# Patient Record
Sex: FEMALE | Race: WHITE | NOT HISPANIC OR LATINO | Employment: OTHER | URBAN - NONMETROPOLITAN AREA
[De-identification: names, ages, dates, MRNs, and addresses within clinical notes are randomized per-mention and may not be internally consistent; named-entity substitution may affect disease eponyms.]

---

## 2018-06-25 ENCOUNTER — OFFICE VISIT (OUTPATIENT)
Dept: CARDIOLOGY | Facility: CLINIC | Age: 63
End: 2018-06-25

## 2018-06-25 VITALS
HEIGHT: 63 IN | HEART RATE: 83 BPM | WEIGHT: 188 LBS | SYSTOLIC BLOOD PRESSURE: 160 MMHG | OXYGEN SATURATION: 94 % | DIASTOLIC BLOOD PRESSURE: 75 MMHG | BODY MASS INDEX: 33.31 KG/M2

## 2018-06-25 DIAGNOSIS — I63.9 CEREBROVASCULAR ACCIDENT (CVA), UNSPECIFIED MECHANISM (HCC): ICD-10-CM

## 2018-06-25 DIAGNOSIS — I25.10 CORONARY ARTERY DISEASE INVOLVING NATIVE CORONARY ARTERY OF NATIVE HEART WITHOUT ANGINA PECTORIS: Primary | ICD-10-CM

## 2018-06-25 DIAGNOSIS — R42 DIZZINESS: ICD-10-CM

## 2018-06-25 DIAGNOSIS — E78.5 HYPERLIPIDEMIA, UNSPECIFIED HYPERLIPIDEMIA TYPE: ICD-10-CM

## 2018-06-25 DIAGNOSIS — R07.2 PRECORDIAL PAIN: ICD-10-CM

## 2018-06-25 DIAGNOSIS — I10 ESSENTIAL HYPERTENSION: ICD-10-CM

## 2018-06-25 DIAGNOSIS — R06.02 SHORTNESS OF BREATH: ICD-10-CM

## 2018-06-25 PROBLEM — F31.9 BIPOLAR 1 DISORDER (HCC): Status: ACTIVE | Noted: 2018-06-25

## 2018-06-25 PROBLEM — E11.9 TYPE 2 DIABETES MELLITUS WITHOUT COMPLICATION (HCC): Status: ACTIVE | Noted: 2018-06-25

## 2018-06-25 PROCEDURE — 99214 OFFICE O/P EST MOD 30 MIN: CPT | Performed by: NURSE PRACTITIONER

## 2018-06-25 PROCEDURE — 93000 ELECTROCARDIOGRAM COMPLETE: CPT | Performed by: NURSE PRACTITIONER

## 2018-06-25 RX ORDER — ASENAPINE 10 MG/1
TABLET SUBLINGUAL
COMMUNITY

## 2018-06-25 RX ORDER — TIZANIDINE 4 MG/1
TABLET ORAL
COMMUNITY
End: 2018-06-25

## 2018-06-25 RX ORDER — LAMOTRIGINE 100 MG/1
100 TABLET ORAL 2 TIMES DAILY
COMMUNITY

## 2018-06-25 RX ORDER — PROMETHAZINE HYDROCHLORIDE 25 MG/1
25 TABLET ORAL AS NEEDED
COMMUNITY

## 2018-06-25 RX ORDER — BUSPIRONE HYDROCHLORIDE 10 MG/1
10 TABLET ORAL 3 TIMES DAILY
COMMUNITY

## 2018-06-25 RX ORDER — OXYCODONE AND ACETAMINOPHEN 10; 325 MG/1; MG/1
1 TABLET ORAL AS NEEDED
COMMUNITY

## 2018-06-25 RX ORDER — OMEPRAZOLE 20 MG/1
20 CAPSULE, DELAYED RELEASE ORAL DAILY
COMMUNITY

## 2018-06-25 RX ORDER — ASPIRIN 81 MG/1
81 TABLET ORAL DAILY
COMMUNITY

## 2018-06-25 RX ORDER — ISOSORBIDE MONONITRATE 30 MG/1
TABLET, EXTENDED RELEASE ORAL
Qty: 30 TABLET | Refills: 11 | Status: SHIPPED | OUTPATIENT
Start: 2018-06-25 | End: 2018-06-25 | Stop reason: SDUPTHER

## 2018-06-25 RX ORDER — ISOSORBIDE MONONITRATE 30 MG/1
TABLET, EXTENDED RELEASE ORAL
Qty: 90 TABLET | Refills: 3 | Status: SHIPPED | OUTPATIENT
Start: 2018-06-25 | End: 2019-02-13

## 2018-06-25 NOTE — PATIENT INSTRUCTIONS
Obesity, Adult  Obesity is the condition of having too much total body fat. Being overweight or obese means that your weight is greater than what is considered healthy for your body size. Obesity is determined by a measurement called BMI. BMI is an estimate of body fat and is calculated from height and weight. For adults, a BMI of 30 or higher is considered obese.  Obesity can eventually lead to other health concerns and major illnesses, including:  · Stroke.  · Coronary artery disease (CAD).  · Type 2 diabetes.  · Some types of cancer, including cancers of the colon, breast, uterus, and gallbladder.  · Osteoarthritis.  · High blood pressure (hypertension).  · High cholesterol.  · Sleep apnea.  · Gallbladder stones.  · Infertility problems.    What are the causes?  The main cause of obesity is taking in (consuming) more calories than your body uses for energy. Other factors that contribute to this condition may include:  · Being born with genes that make you more likely to become obese.  · Having a medical condition that causes obesity. These conditions include:  ? Hypothyroidism.  ? Polycystic ovarian syndrome (PCOS).  ? Binge-eating disorder.  ? Cushing syndrome.  · Taking certain medicines, such as steroids, antidepressants, and seizure medicines.  · Not being physically active (sedentary lifestyle).  · Living where there are limited places to exercise safely or buy healthy foods.  · Not getting enough sleep.    What increases the risk?  The following factors may increase your risk of this condition:  · Having a family history of obesity.  · Being a woman of -American descent.  · Being a man of  descent.    What are the signs or symptoms?  Having excessive body fat is the main symptom of this condition.  How is this diagnosed?  This condition may be diagnosed based on:  · Your symptoms.  · Your medical history.  · A physical exam. Your health care provider may measure:  ? Your BMI. If you are an  adult with a BMI between 25 and less than 30, you are considered overweight. If you are an adult with a BMI of 30 or higher, you are considered obese.  ? The distances around your hips and your waist (circumferences). These may be compared to each other to help diagnose your condition.  ? Your skinfold thickness. Your health care provider may gently pinch a fold of your skin and measure it.    How is this treated?  Treatment for this condition often includes changing your lifestyle. Treatment may include some or all of the following:  · Dietary changes. Work with your health care provider and a dietitian to set a weight-loss goal that is healthy and reasonable for you. Dietary changes may include eating:  ? Smaller portions. A portion size is the amount of a particular food that is healthy for you to eat at one time. This varies from person to person.  ? Low-calorie or low-fat options.  ? More whole grains, fruits, and vegetables.  · Regular physical activity. This may include aerobic activity (cardio) and strength training.  · Medicine to help you lose weight. Your health care provider may prescribe medicine if you are unable to lose 1 pound a week after 6 weeks of eating more healthily and doing more physical activity.  · Surgery. Surgical options may include gastric banding and gastric bypass. Surgery may be done if:  ? Other treatments have not helped to improve your condition.  ? You have a BMI of 40 or higher.  ? You have life-threatening health problems related to obesity.    Follow these instructions at home:    Eating and drinking    · Follow recommendations from your health care provider about what you eat and drink. Your health care provider may advise you to:  ? Limit fast foods, sweets, and processed snack foods.  ? Choose low-fat options, such as low-fat milk instead of whole milk.  ? Eat 5 or more servings of fruits or vegetables every day.  ? Eat at home more often. This gives you more control over  what you eat.  ? Choose healthy foods when you eat out.  ? Learn what a healthy portion size is.  ? Keep low-fat snacks on hand.  ? Avoid sugary drinks, such as soda, fruit juice, iced tea sweetened with sugar, and flavored milk.  ? Eat a healthy breakfast.  · Drink enough water to keep your urine clear or pale yellow.  · Do not go without eating for long periods of time (do not fast) or follow a fad diet. Fasting and fad diets can be unhealthy and even dangerous.  Physical Activity  · Exercise regularly, as told by your health care provider. Ask your health care provider what types of exercise are safe for you and how often you should exercise.  · Warm up and stretch before being active.  · Cool down and stretch after being active.  · Rest between periods of activity.  Lifestyle  · Limit the time that you spend in front of your TV, computer, or video game system.  · Find ways to reward yourself that do not involve food.  · Limit alcohol intake to no more than 1 drink a day for nonpregnant women and 2 drinks a day for men. One drink equals 12 oz of beer, 5 oz of wine, or 1½ oz of hard liquor.  General instructions  · Keep a weight loss journal to keep track of the food you eat and how much you exercise you get.  · Take over-the-counter and prescription medicines only as told by your health care provider.  · Take vitamins and supplements only as told by your health care provider.  · Consider joining a support group. Your health care provider may be able to recommend a support group.  · Keep all follow-up visits as told by your health care provider. This is important.  Contact a health care provider if:  · You are unable to meet your weight loss goal after 6 weeks of dietary and lifestyle changes.  This information is not intended to replace advice given to you by your health care provider. Make sure you discuss any questions you have with your health care provider.  Document Released: 01/25/2006 Document Revised:  05/22/2017 Document Reviewed: 10/05/2016  [employer] Interactive Patient Education © 2018 [employer]  MyPlate from [employer]  The general, healthful diet is based on the 2010 Dietary Guidelines for Americans. The amount of food you need to eat from each food group depends on your age, sex, and level of physical activity and can be individualized by a dietitian. Go to ChooseMyPlate.gov for more information.  What do I need to know about the MyPlate plan?  · Enjoy your food, but eat less.  · Avoid oversized portions.  ? ½ of your plate should include fruits and vegetables.  ? ¼ of your plate should be grains.  ? ¼ of your plate should be protein.  Grains  · Make at least half of your grains whole grains.  · For a 2,000 calorie daily food plan, eat 6 oz every day.  · 1 oz is about 1 slice bread, 1 cup cereal, or ½ cup cooked rice, cereal, or pasta.  Vegetables  · Make half your plate fruits and vegetables.  · For a 2,000 calorie daily food plan, eat 2½ cups every day.  · 1 cup is about 1 cup raw or cooked vegetables or vegetable juice or 2 cups raw leafy greens.  Fruits  · Make half your plate fruits and vegetables.  · For a 2,000 calorie daily food plan, eat 2 cups every day.  · 1 cup is about 1 cup fruit or 100% fruit juice or ½ cup dried fruit.  Protein  · For a 2,000 calorie daily food plan, eat 5½ oz every day.  · 1 oz is about 1 oz meat, poultry, or fish, ¼ cup cooked beans, 1 egg, 1 Tbsp peanut butter, or ½ oz nuts or seeds.  Dairy  · Switch to fat-free or low-fat (1%) milk.  · For a 2,000 calorie daily food plan, eat 3 cups every day.  · 1 cup is about 1 cup milk or yogurt or soy milk (soy beverage), 1½ oz natural cheese, or 2 oz processed cheese.  Fats, Oils, and Empty Calories  · Only small amounts of oils are recommended.  · Empty calories are calories from solid fats or added sugars.  · Compare sodium in foods like soup, bread, and frozen meals. Choose the foods with lower numbers.  · Drink water instead of  sugary drinks.  What foods can I eat?  Grains  Whole grains such as whole wheat, quinoa, millet, and bulgur. Bread, rolls, and pasta made from whole grains. Brown or wild rice. Hot or cold cereals made from whole grains and without added sugar.  Vegetables  All fresh vegetables, especially fresh red, dark green, or orange vegetables. Peas and beans. Low-sodium frozen or canned vegetables prepared without added salt. Low-sodium vegetable juices.  Fruits  All fresh, frozen, and dried fruits. Canned fruit packed in water or fruit juice without added sugar. Fruit juices without added sugar.  Meats and Other Protein Sources  Boiled, baked, or grilled lean meat trimmed of fat. Skinless poultry. Fresh seafood and shellfish. Canned seafood packed in water. Unsalted nuts and unsalted nut butters. Tofu. Dried beans and pea. Eggs.  Dairy  Low-fat or fat-free milk, yogurt, and cheeses.  Sweets and Desserts  Frozen desserts made from low-fat milk.  Fats and Oils  Olive, peanut, and canola oils and margarine. Salad dressing and mayonnaise made from these oils.  Other  Soups and casseroles made from allowed ingredients and without added fat or salt.  The items listed above may not be a complete list of recommended foods or beverages. Contact your dietitian for more options.  What foods are not recommended?  Grains  Sweetened, low-fiber cereals. Packaged baked goods. Snack crackers and chips. Cheese crackers, butter crackers, and biscuits. Frozen waffles, sweet breads, doughnuts, pastries, packaged baking mixes, pancakes, cakes, and cookies.  Vegetables  Regular canned or frozen vegetables or vegetables prepared with salt. Canned tomatoes. Canned tomato sauce. Fried vegetables. Vegetables in cream sauce or cheese sauce.  Fruits  Fruits packed in syrup or made with added sugar.  Meats and Other Protein Sources  Marbled or fatty meats such as ribs. Poultry with skin. Fried meats, poultry, eggs, or fish. Sausages, hot dogs, and deli  meats such as pastrami, bologna, or salami.  Dairy  Whole milk, cream, cheeses made from whole milk, sour cream. Ice cream or yogurt made from whole milk or with added sugar.  Beverages  For adults, no more than one alcoholic drink per day. Regular soft drinks or other sugary beverages. Juice drinks.  Sweets and Desserts  Sugary or fatty desserts, candy, and other sweets.  Fats and Oils  Solid shortening or partially hydrogenated oils. Solid margarine. Margarine that contains trans fats. Butter.  The items listed above may not be a complete list of foods and beverages to avoid. Contact your dietitian for more information.  This information is not intended to replace advice given to you by your health care provider. Make sure you discuss any questions you have with your health care provider.  Document Released: 01/06/2009 Document Revised: 05/25/2017 Document Reviewed: 11/26/2014  [employer] Interactive Patient Education © 2018 [employer]    Nonspecific Chest Pain  Chest pain can be caused by many different conditions. There is a chance that your pain could be related to something serious, such as a heart attack or a blood clot in your lungs. Chest pain can also be caused by conditions that are not life-threatening. If you have chest pain, it is very important to follow up with your doctor.  Follow these instructions at home:  Medicines  · If you were prescribed an antibiotic medicine, take it as told by your doctor. Do not stop taking the antibiotic even if you start to feel better.  · Take over-the-counter and prescription medicines only as told by your doctor.  Lifestyle  · Do not use any products that contain nicotine or tobacco, such as cigarettes and e-cigarettes. If you need help quitting, ask your doctor.  · Do not drink alcohol.  · Make lifestyle changes as told by your doctor. These may include:  ? Getting regular exercise. Ask your doctor for some activities that are safe for you.  ? Eating a  heart-healthy diet. A diet specialist (dietitian) can help you to learn healthy eating options.  ? Staying at a healthy weight.  ? Managing diabetes, if needed.  ? Lowering your stress, as with deep breathing or spending time in nature.  General instructions  · Avoid any activities that make you feel chest pain.  · If your chest pain is because of heartburn:  ? Raise (elevate) the head of your bed about 6 inches (15 cm). You can do this by putting blocks under the bed legs at the head of the bed.  ? Do not sleep with extra pillows under your head. That does not help heartburn.  · Keep all follow-up visits as told by your doctor. This is important. This includes any further testing if your chest pain does not go away.  Contact a doctor if:  · Your chest pain does not go away.  · You have a rash with blisters on your chest.  · You have a fever.  · You have chills.  Get help right away if:  · Your chest pain is worse.  · You have a cough that gets worse, or you cough up blood.  · You have very bad (severe) pain in your belly (abdomen).  · You are very weak.  · You pass out (faint).  · You have either of these for no clear reason:  ? Sudden chest discomfort.  ? Sudden discomfort in your arms, back, neck, or jaw.  · You have shortness of breath at any time.  · You suddenly start to sweat, or your skin gets clammy.  · You feel sick to your stomach (nauseous).  · You throw up (vomit).  · You suddenly feel light-headed or dizzy.  · Your heart starts to beat fast, or it feels like it is skipping beats.  These symptoms may be an emergency. Do not wait to see if the symptoms will go away. Get medical help right away. Call your local emergency services (911 in the [place] Do not drive yourself to the hospital.  This information is not intended to replace advice given to you by your health care provider. Make sure you discuss any questions you have with your health care provider.  Document Released: 06/05/2009 Document Revised:  09/11/2017 Document Reviewed: 09/11/2017  [employer] Interactive Patient Education © 2017 Elsevier Inc.

## 2018-06-25 NOTE — PROGRESS NOTES
Subjective   Keara Ortiz is a 62 y.o. female     Chief Complaint   Patient presents with   • Hypertension     presents as a new patient    • Chest Pain   • Shortness of Breath   • Palpitations       HPI    Problem List:    1. Minor nonobstructive CAD per cath in 2006.  1.1 Left heart catheter 7/27/06-20% proximal narrowing to the circumflex, EF 65-70%, left ventricular end-diastolic pressure 25  1.2 Stress test 10/13/14-low risk, no evidence of ischemia  2. Hypertension  3. Dyslipidemia  4. Diabetes mellitus  5. CVA   5.1 CT Head 5/31/18 - mod remote microvascular ischemic changes in the periventricular white matter with no acute abnormality or change   6. Shortness of breath 10/13/14-EF 60-65%, mild LVH, mild diastolic dysfunction, revealed to mild TR, mild posterior pericardial effusion which may extend minimally to the lateral wall, PA 30-35  7. History of pancreatitis    Patient is a 62-year-old female who presents today for follow-up.  She states that she has been having midsternal chest tightness that radiates into the neck and down the right arm to her elbow.  She's been having this roughly for one year now.  She will take 1 nitroglycerin when it occurs and it will resolve.  It can occur at any time she has every couple of days.  She will get short of breath, nauseated, lightheaded, diaphoretic and she's been very fatigued since this started.She says she has palpitations with her chest pain as well.  She says she will get dizzy at times with position change.  She denies any presyncope, syncope, orthopnea or PND.  She does get swelling in her knees with a history of knee surgeries.  She says the swelling in her feet has improved significantly.  She says for approximately 8 months she has been short of breath with pretty much any activity that she does.  She says about a couple weeks ago she woke up could not use her left side.  She says she drug herself to her daughter's room and they got her back in  bed.  She says the next day she then had some language issues.  She says eventually somewhat resolved.  She was recently in the hospital for urosepsis.She says Dr. Hensley is going to be her new PCP.  She was living in  for the last 3 years but just moved back because her kids and grandchildren are here.      Current Outpatient Prescriptions   Medication Sig Dispense Refill   • asenapine maleate (SAPHRIS) 10 MG sublingual tablet sublingual tablet Saphris (black cherry) 10 mg sublingual tablet     • aspirin 81 MG EC tablet Take 81 mg by mouth Daily.     • busPIRone (BUSPAR) 10 MG tablet Take 10 mg by mouth 3 (Three) Times a Day.     • CloNIDine (CATAPRES) 0.1 MG tablet Take 1 tablet by mouth Every 6 (Six) Hours As Needed.     • CloNIDine (CATAPRES-TTS) 0.3 MG/24HR patch clonidine 0.3 mg/24 hr weekly transdermal patch     • dicyclomine (BENTYL) 10 MG capsule Take 10 mg by mouth 4 (Four) Times a Day Before Meals & at Bedtime.     • furosemide (LASIX) 20 MG tablet Take 20 mg by mouth Daily As Needed.     • gabapentin (NEURONTIN) 800 MG tablet Take 800 mg by mouth 3 (Three) Times a Day.     • Labetalol HCl (TRANDATE PO) Take 400 mg by mouth 2 (Two) Times a Day.     • lamoTRIgine (LaMICtal) 100 MG tablet Take 100 mg by mouth 2 (Two) Times a Day.     • lisinopril (PRINIVIL,ZESTRIL) 40 MG tablet Take 40 mg by mouth Daily.     • nitroglycerin (NITROSTAT) 0.4 MG SL tablet Place  under the tongue.     • OLANZapine (zyPREXA) 10 MG tablet Take 10 mg by mouth Daily.     • omeprazole (priLOSEC) 20 MG capsule Take 20 mg by mouth Daily.     • ondansetron (ZOFRAN) 4 MG tablet Take 4 mg by mouth Every 8 (Eight) Hours As Needed for Nausea or Vomiting.     • oxyCODONE-acetaminophen (PERCOCET)  MG per tablet Take 1 tablet by mouth As Needed for Moderate Pain .     • pancrelipase, Lip-Prot-Amyl, (CREON) 84449-06736 units capsule delayed-release particles capsule Take 24,000 units of lipase by mouth 3 (Three) Times a Day With  Meals.     • promethazine (PHENERGAN) 25 MG tablet Take 25 mg by mouth As Needed for Nausea or Vomiting.     • TiZANidine (ZANAFLEX) 4 MG capsule Take 4 mg by mouth 3 (Three) Times a Day.     • isosorbide mononitrate (IMDUR) 30 MG 24 hr tablet Take 1/2 tablet daily 90 tablet 3     No current facility-administered medications for this visit.        ALLERGIES    Fentanyl; Midazolam; Contrast dye; Hydrocodone; Metoclopramide; Sulfa antibiotics; Zolpidem; and Penicillins    Past Medical History:   Diagnosis Date   • Disease of thyroid gland    • Hyperlipidemia    • Hypertension    • Kidney stone    • Pneumonia        Social History     Social History   • Marital status:      Spouse name: N/A   • Number of children: N/A   • Years of education: N/A     Occupational History   • Not on file.     Social History Main Topics   • Smoking status: Never Smoker   • Smokeless tobacco: Not on file   • Alcohol use No   • Drug use: Unknown   • Sexual activity: Defer     Other Topics Concern   • Not on file     Social History Narrative   • No narrative on file       Family History   Problem Relation Age of Onset   • Heart disease Mother    • Cancer Mother    • Hypertension Mother    • Hyperlipidemia Mother    • Heart disease Father    • Cancer Father    • Hyperlipidemia Father    • Hypertension Father        Review of Systems   Constitutional: Positive for diaphoresis (with CP ) and fatigue (for the past year ).   HENT: Positive for hearing loss and trouble swallowing (throat gets tight). Negative for rhinorrhea and sneezing.    Eyes: Positive for visual disturbance ( wears glasses ).   Respiratory: Positive for shortness of breath (with CP; for about 8 mos with any activity ). Negative for chest tightness.    Cardiovascular: Positive for chest pain ( midsternum chest tightness rad into the neck and down the right arm to her elbow; for 1 year; 1 nitro per episode; every couple of days; any time ), palpitations (with CP ) and leg  "swelling (knees, history of knee sx; feet are better).   Gastrointestinal: Positive for diarrhea and nausea (with CP ). Negative for constipation and vomiting.   Endocrine: Negative.    Genitourinary: Positive for difficulty urinating.   Musculoskeletal: Positive for arthralgias, back pain, myalgias and neck pain ( patient has noticed a squeezing and pain ).   Skin: Negative.    Allergic/Immunologic: Negative for environmental allergies and food allergies.   Neurological: Positive for dizziness, weakness (left side a coule of weeks ago ) and light-headedness (with CP ). Negative for syncope.   Hematological: Bruises/bleeds easily ( bruises easily ).   Psychiatric/Behavioral: Positive for confusion ( patient states she has been very confused lately and has recently been in the hospital ). The patient is not nervous/anxious.        Objective   /75 (BP Location: Left arm, Patient Position: Sitting)   Pulse 83   Ht 160 cm (63\")   Wt 85.3 kg (188 lb)   SpO2 94%   BMI 33.30 kg/m²   Vitals:    06/25/18 1534   BP: 160/75   BP Location: Left arm   Patient Position: Sitting   Pulse: 83   SpO2: 94%   Weight: 85.3 kg (188 lb)   Height: 160 cm (63\")      Lab Results (most recent)     None        Physical Exam   Constitutional: She is oriented to person, place, and time. Vital signs are normal. She appears well-developed and well-nourished. She is active and cooperative.   HENT:   Head: Normocephalic.   Mouth/Throat: She has dentures (upper).   Eyes: Lids are normal.   Wears glasses    Neck: Normal carotid pulses, no hepatojugular reflux and no JVD present. Carotid bruit is not present.   Cardiovascular: Normal rate and regular rhythm.    Murmur heard.   Systolic (RUSB, LUSB and LLSB 1-2/6) murmur is present   Pulses:       Radial pulses are 2+ on the right side, and 2+ on the left side.        Dorsalis pedis pulses are 2+ on the right side, and 2+ on the left side.        Posterior tibial pulses are 2+ on the right " side, and 2+ on the left side.   1+ edema BLE.    Pulmonary/Chest: Effort normal and breath sounds normal.   Abdominal: Normal appearance and bowel sounds are normal.   Neurological: She is alert and oriented to person, place, and time.   Skin: Skin is warm, dry and intact.   Psychiatric: She has a normal mood and affect. Her speech is normal and behavior is normal. Judgment and thought content normal. Cognition and memory are normal.       Procedure     ECG 12 Lead  Date/Time: 6/25/2018 4:20 PM  Performed by: CHOCO AGARWAL  Authorized by: CHOCO AGARWAL   Comparison: compared with previous ECG from 11/11/2014  Similar to previous ECG  Rhythm: sinus rhythm  Rate: normal  BPM: 78  QRS axis: normal  Clinical impression: normal ECG                 Assessment/Plan      Diagnosis Plan   1. Coronary artery disease involving native coronary artery of native heart without angina pectoris  Stress Test With Myocardial Perfusion One Day    Adult Transthoracic Echo Complete W/ Cont if Necessary Per Protocol   2. Essential hypertension  Stress Test With Myocardial Perfusion One Day    Adult Transthoracic Echo Complete W/ Cont if Necessary Per Protocol    Blood Pressure Device   3. Hyperlipidemia, unspecified hyperlipidemia type  Stress Test With Myocardial Perfusion One Day    Adult Transthoracic Echo Complete W/ Cont if Necessary Per Protocol   4. Precordial pain  Stress Test With Myocardial Perfusion One Day    Adult Transthoracic Echo Complete W/ Cont if Necessary Per Protocol    isosorbide mononitrate (IMDUR) 30 MG 24 hr tablet   5. Shortness of breath  Stress Test With Myocardial Perfusion One Day    Adult Transthoracic Echo Complete W/ Cont if Necessary Per Protocol   6. Dizziness  US Carotid Bilateral   7. Cerebrovascular accident (CVA), unspecified mechanism  US Carotid Bilateral       Return in about 8 weeks (around 8/20/2018).    CAD/Hypertension/hyperlipidemia/chest pain/shortness of breath-patient will have  ischemia workup, stress and echo.  Dizziness/CVA-patient will have carotid ultrasound.  She will start isosorbide half a tablet day.  She will use nitroglycerin when necessary for chest pain no resolution she'll go to the ER.  She will follow-up in 8 weeks or sooner if any changes.         Patient's Body mass index is 33.3 kg/m². BMI is above normal parameters. Recommendations include: educational material.      Electronically signed by:

## 2018-07-24 ENCOUNTER — OUTSIDE FACILITY SERVICE (OUTPATIENT)
Dept: CARDIOLOGY | Facility: CLINIC | Age: 63
End: 2018-07-24

## 2018-07-24 ENCOUNTER — HOSPITAL ENCOUNTER (OUTPATIENT)
Dept: CARDIOLOGY | Facility: HOSPITAL | Age: 63
Discharge: HOME | End: 2018-07-24

## 2018-07-24 LAB
MAXIMAL PREDICTED HEART RATE: 156 BPM
MAXIMAL PREDICTED HEART RATE: 156 BPM
STRESS TARGET HR: 133 BPM
STRESS TARGET HR: 133 BPM

## 2018-07-24 PROCEDURE — 93018 CV STRESS TEST I&R ONLY: CPT | Performed by: INTERNAL MEDICINE

## 2018-07-24 PROCEDURE — 93880 EXTRACRANIAL BILAT STUDY: CPT | Performed by: INTERNAL MEDICINE

## 2018-07-24 PROCEDURE — 25010000002 REGADENOSON 0.4 MG/5ML SOLUTION: Performed by: INTERNAL MEDICINE

## 2018-07-24 PROCEDURE — 78452 HT MUSCLE IMAGE SPECT MULT: CPT | Performed by: INTERNAL MEDICINE

## 2018-07-24 PROCEDURE — 93880 EXTRACRANIAL BILAT STUDY: CPT

## 2018-07-24 PROCEDURE — 93017 CV STRESS TEST TRACING ONLY: CPT

## 2018-07-24 PROCEDURE — 78452 HT MUSCLE IMAGE SPECT MULT: CPT

## 2018-07-24 PROCEDURE — A9500 TC99M SESTAMIBI: HCPCS | Performed by: INTERNAL MEDICINE

## 2018-07-24 PROCEDURE — 93306 TTE W/DOPPLER COMPLETE: CPT

## 2018-07-24 PROCEDURE — 0 TECHNETIUM SESTAMIBI: Performed by: INTERNAL MEDICINE

## 2018-07-24 PROCEDURE — 93306 TTE W/DOPPLER COMPLETE: CPT | Performed by: INTERNAL MEDICINE

## 2018-07-24 RX ADMIN — TECHNETIUM TC 99M SESTAMIBI 1 DOSE: 1 INJECTION INTRAVENOUS at 08:45

## 2018-07-24 RX ADMIN — REGADENOSON 0.4 MG: 0.08 INJECTION, SOLUTION INTRAVENOUS at 08:45

## 2018-07-25 ENCOUNTER — TELEPHONE (OUTPATIENT)
Dept: CARDIOLOGY | Facility: CLINIC | Age: 63
End: 2018-07-25
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
## 2018-07-26 ENCOUNTER — TELEPHONE (OUTPATIENT)
Dept: CARDIOLOGY | Facility: CLINIC | Age: 63
End: 2018-07-26

## 2018-07-26 NOTE — TELEPHONE ENCOUNTER
Patient made aware of carotid u/s results. patient verbalized understanding and is to call our office with questions or concerns. Daniel/GIO     ----- Message from ERINN Suh sent at 7/26/2018  3:21 PM EDT -----  Will you please advise patient

## 2018-07-26 NOTE — TELEPHONE ENCOUNTER
Patient stated she is just now getting discharged from [facility] and states they brought her BP  to [facility] and was told to f/u with Dr. Hensley and our office in 1 week due to abnormal stress testing. I offered to scheduled patient a f/u apt. At the time and she asked we call her back in about 30 minutes when she got home to schedule. MURTAZA Joseph called to schedule patient an apt. And is to call our office with questions or concerns.      ----- Message from Aura Ghotra sent at 7/26/2018 10:19 AM EDT -----  Contact: PATIENT  THE PATIENT CALLED AND STATES SHE WAS SUPPOSED TO CALL TODAY AND  SPEAK WITH STEVE.  SHE CAN BE REACHED -[phone].  THANKS

## 2018-08-21 ENCOUNTER — OFFICE VISIT (OUTPATIENT)
Dept: CARDIOLOGY | Facility: CLINIC | Age: 63
End: 2018-08-21

## 2018-08-21 VITALS
BODY MASS INDEX: 32.46 KG/M2 | DIASTOLIC BLOOD PRESSURE: 75 MMHG | SYSTOLIC BLOOD PRESSURE: 132 MMHG | HEIGHT: 63 IN | WEIGHT: 183.2 LBS | OXYGEN SATURATION: 93 % | HEART RATE: 82 BPM

## 2018-08-21 DIAGNOSIS — I25.10 CORONARY ARTERY DISEASE INVOLVING NATIVE CORONARY ARTERY OF NATIVE HEART WITHOUT ANGINA PECTORIS: ICD-10-CM

## 2018-08-21 DIAGNOSIS — R06.02 SHORTNESS OF BREATH: Primary | ICD-10-CM

## 2018-08-21 DIAGNOSIS — I10 ESSENTIAL HYPERTENSION: ICD-10-CM

## 2018-08-21 PROCEDURE — 99214 OFFICE O/P EST MOD 30 MIN: CPT | Performed by: PHYSICIAN ASSISTANT

## 2018-08-21 NOTE — PATIENT INSTRUCTIONS
Heart-Healthy Eating Plan  Many factors influence your heart health, including eating and exercise habits. Heart (coronary) risk increases with abnormal blood fat (lipid) levels. Heart-healthy meal planning includes limiting unhealthy fats, increasing healthy fats, and making other small dietary changes. This includes maintaining a healthy body weight to help keep lipid levels within a normal range.  What is my plan?  Your health care provider recommends that you:  · Get no more than _________% of the total calories in your daily diet from fat.  · Limit your intake of saturated fat to less than _________% of your total calories each day.  · Limit the amount of cholesterol in your diet to less than _________ mg per day.    What types of fat should I choose?  · Choose healthy fats more often. Choose monounsaturated and polyunsaturated fats, such as olive oil and canola oil, flaxseeds, walnuts, almonds, and seeds.  · Eat more omega-3 fats. Good choices include salmon, mackerel, sardines, tuna, flaxseed oil, and ground flaxseeds. Aim to eat fish at least two times each week.  · Limit saturated fats. Saturated fats are primarily found in animal products, such as meats, butter, and cream. Plant sources of saturated fats include palm oil, palm kernel oil, and coconut oil.  · Avoid foods with partially hydrogenated oils in them. These contain trans fats. Examples of foods that contain trans fats are stick margarine, some tub margarines, cookies, crackers, and other baked goods.  What general guidelines do I need to follow?  · Check food labels carefully to identify foods with trans fats or high amounts of saturated fat.  · Fill one half of your plate with vegetables and green salads. Eat 4-5 servings of vegetables per day. A serving of vegetables equals 1 cup of raw leafy vegetables, ½ cup of raw or cooked cut-up vegetables, or ½ cup of vegetable juice.  · Fill one fourth of your plate with whole grains. Look for the word  "\"whole\" as the first word in the ingredient list.  · Fill one fourth of your plate with lean protein foods.  · Eat 4-5 servings of fruit per day. A serving of fruit equals one medium whole fruit, ¼ cup of dried fruit, ½ cup of fresh, frozen, or canned fruit, or ½ cup of 100% fruit juice.  · Eat more foods that contain soluble fiber. Examples of foods that contain this type of fiber are apples, broccoli, carrots, beans, peas, and barley. Aim to get 20-30 g of fiber per day.  · Eat more home-cooked food and less restaurant, buffet, and fast food.  · Limit or avoid alcohol.  · Limit foods that are high in starch and sugar.  · Avoid fried foods.  · Cook foods by using methods other than frying. Baking, boiling, grilling, and broiling are all great options. Other fat-reducing suggestions include:  ? Removing the skin from poultry.  ? Removing all visible fats from meats.  ? Skimming the fat off of stews, soups, and gravies before serving them.  ? Steaming vegetables in water or broth.  · Lose weight if you are overweight. Losing just 5-10% of your initial body weight can help your overall health and prevent diseases such as diabetes and heart disease.  · Increase your consumption of nuts, legumes, and seeds to 4-5 servings per week. One serving of dried beans or legumes equals ½ cup after being cooked, one serving of nuts equals 1½ ounces, and one serving of seeds equals ½ ounce or 1 tablespoon.  · You may need to monitor your salt (sodium) intake, especially if you have high blood pressure. Talk with your health care provider or dietitian to get more information about reducing sodium.  What foods can I eat?  Grains    Breads, including [place] citizen, white, jonathan, wheat, raisin, rye, oatmeal, and Italian. Tortillas that are neither fried nor made with lard or trans fat. Low-fat rolls, including hotdog and hamburger buns and English muffins. Biscuits. Muffins. Waffles. Pancakes. Light popcorn. Whole-grain cereals. Flatbread. " Tess toast. Pretzels. Breadsticks. Rusks. Low-fat snacks and crackers, including oyster, saltine, matzo, john, animal, and rye. Rice and pasta, including brown rice and those that are made with whole wheat.  Vegetables  All vegetables.  Fruits  All fruits, but limit coconut.  Meats and Other Protein Sources  Lean, well-trimmed beef, veal, pork, and lamb. Chicken and turkey without skin. All fish and shellfish. Wild duck, rabbit, pheasant, and venison. Egg whites or low-cholesterol egg substitutes. Dried beans, peas, lentils, and tofu. Seeds and most nuts.  Dairy  Low-fat or nonfat cheeses, including ricotta, string, and mozzarella. Skim or 1% milk that is liquid, powdered, or evaporated. Buttermilk that is made with low-fat milk. Nonfat or low-fat yogurt.  Beverages  Mineral water. Diet carbonated beverages.  Sweets and Desserts  Sherbets and fruit ices. Honey, jam, marmalade, jelly, and syrups. Meringues and gelatins. Pure sugar candy, such as hard candy, jelly beans, gumdrops, mints, marshmallows, and small amounts of dark chocolate. Bakari food cake.  Eat all sweets and desserts in moderation.  Fats and Oils  Nonhydrogenated (trans-free) margarines. Vegetable oils, including soybean, sesame, sunflower, olive, peanut, safflower, corn, canola, and cottonseed. Salad dressings or mayonnaise that are made with a vegetable oil. Limit added fats and oils that you use for cooking, baking, salads, and as spreads.  Other  Cocoa powder. Coffee and tea. All seasonings and condiments.  The items listed above may not be a complete list of recommended foods or beverages. Contact your dietitian for more options.  What foods are not recommended?  Grains  Breads that are made with saturated or trans fats, oils, or whole milk. Croissants. Butter rolls. Cheese breads. Sweet rolls. Donuts. Buttered popcorn. Chow mein noodles. High-fat crackers, such as cheese or butter crackers.  Meats and Other Protein Sources  Fatty meats, such  as hotdogs, short ribs, sausage, spareribs, castro, ribeye roast or steak, and mutton. High-fat deli meats, such as salami and bologna. Caviar. Domestic duck and goose. Organ meats, such as kidney, liver, sweetbreads, brains, gizzard, chitterlings, and heart.  Dairy  Cream, sour cream, cream cheese, and creamed cottage cheese. Whole milk cheeses, including blue (rogelio), Chevak Dax, Brie, Az, American, Havarti, Swiss, cheddar, Camembert, and [place]. Whole or 2% milk that is liquid, evaporated, or condensed. Whole buttermilk. Cream sauce or high-fat cheese sauce. Yogurt that is made from whole milk.  Beverages  Regular sodas and drinks with added sugar.  Sweets and Desserts  Frosting. Pudding. Cookies. Cakes other than nicole food cake. Candy that has milk chocolate or white chocolate, hydrogenated fat, butter, coconut, or unknown ingredients. Buttered syrups. Full-fat ice cream or ice cream drinks.  Fats and Oils  Gravy that has suet, meat fat, or shortening. Cocoa butter, hydrogenated oils, palm oil, coconut oil, palm kernel oil. These can often be found in baked products, candy, fried foods, nondairy creamers, and whipped toppings. Solid fats and shortenings, including castro fat, salt pork, lard, and butter. Nondairy cream substitutes, such as coffee creamers and sour cream substitutes. Salad dressings that are made of unknown oils, cheese, or sour cream.  The items listed above may not be a complete list of foods and beverages to avoid. Contact your dietitian for more information.  This information is not intended to replace advice given to you by your health care provider. Make sure you discuss any questions you have with your health care provider.  Document Released: 09/26/2009 Document Revised: 07/07/2017 Document Reviewed: 06/11/2015  [employer] Interactive Patient Education © 2018 [employer]

## 2018-08-21 NOTE — PROGRESS NOTES
Problem list     Subjective   Keara Ortiz is a 62 y.o. female     Chief Complaint   Patient presents with   • Follow-up     here for hospital f/u   • Palpitations   • Coronary Artery Disease   • Shortness of Breath   • Hypertension       HPI    Problem List:     1. Minor nonobstructive CAD per cath in 2006.  1.1 Left heart catheter 7/27/06-20% proximal narrowing to the circumflex, EF 65-70%, left ventricular end-diastolic pressure 25  1.2 Stress test 10/13/14-low risk, no evidence of ischemia  2. Hypertension  3. Dyslipidemia  4. Diabetes mellitus  5. CVA   5.1 CT Head 5/31/18 - mod remote microvascular ischemic changes in the periventricular white matter with no acute abnormality or change   6. Shortness of breath 10/13/14-EF 60-65%, mild LVH, mild diastolic dysfunction, revealed to mild TR, mild posterior pericardial effusion which may extend minimally to the lateral wall, PA 30-35  7. History of pancreatitis    Patient is a 62-year-old female that presents back to the office for follow-up.  Patient was recently admitted in the hospital had small bowel obstruction.  She went underwent exploratory laparotomy by Dr. Gonzalez.  She had adhesions lysed.  She was discharged.    Patient's blood pressure is much improved.  She was seen prior to surgery and admitted once before surgery because of hypertension that is improved.  When she was seen here last before hospital admission, she had stress test and echocardiogram.  Stress test suggest inferior ischemia.  Echocardiogram suggested normal LV function with mild to moderate tricuspid regurgitation and mild mitral regurgitation.    She does not describe any chest discomfort.  She does have mild levels to moderate levels of shortness of breath.  She feels that she is recovering from recent surgery.  She denies PND orthopnea.  She doesn't palpitate or dysrhythmic symptoms and otherwise is doing well      Outpatient Encounter Prescriptions as of 8/21/2018   Medication  Sig Dispense Refill   • asenapine maleate (SAPHRIS) 10 MG sublingual tablet sublingual tablet Saphris (black cherry) 10 mg sublingual tablet     • aspirin 81 MG EC tablet Take 81 mg by mouth Daily.     • busPIRone (BUSPAR) 10 MG tablet Take 10 mg by mouth 3 (Three) Times a Day.     • CloNIDine (CATAPRES) 0.1 MG tablet Take 1 tablet by mouth Every 6 (Six) Hours As Needed.     • CloNIDine (CATAPRES-TTS) 0.3 MG/24HR patch clonidine 0.3 mg/24 hr weekly transdermal patch     • dicyclomine (BENTYL) 10 MG capsule Take 10 mg by mouth 4 (Four) Times a Day Before Meals & at Bedtime.     • furosemide (LASIX) 20 MG tablet Take 20 mg by mouth Daily As Needed.     • gabapentin (NEURONTIN) 800 MG tablet Take 800 mg by mouth 3 (Three) Times a Day.     • isosorbide mononitrate (IMDUR) 30 MG 24 hr tablet Take 1/2 tablet daily 90 tablet 3   • labetalol (TRANDATE) 100 MG tablet Take 400 mg by mouth 2 (Two) Times a Day. Two tablets twice daily.     • lamoTRIgine (LaMICtal) 100 MG tablet Take 100 mg by mouth 2 (Two) Times a Day.     • lisinopril (PRINIVIL,ZESTRIL) 40 MG tablet Take 40 mg by mouth Daily.     • nitroglycerin (NITROSTAT) 0.4 MG SL tablet Place  under the tongue.     • OLANZapine (zyPREXA) 10 MG tablet Take 10 mg by mouth Daily.     • omeprazole (priLOSEC) 20 MG capsule Take 20 mg by mouth Daily.     • ondansetron (ZOFRAN) 4 MG tablet Take 4 mg by mouth Every 8 (Eight) Hours As Needed for Nausea or Vomiting.     • oxyCODONE-acetaminophen (PERCOCET)  MG per tablet Take 1 tablet by mouth As Needed for Moderate Pain .     • pancrelipase, Lip-Prot-Amyl, (CREON) 84658-01873 units capsule delayed-release particles capsule Take 24,000 units of lipase by mouth 3 (Three) Times a Day With Meals.     • promethazine (PHENERGAN) 25 MG tablet Take 25 mg by mouth As Needed for Nausea or Vomiting.     • TiZANidine (ZANAFLEX) 4 MG capsule Take 4 mg by mouth 3 (Three) Times a Day.       No facility-administered encounter medications on  file as of 8/21/2018.        Fentanyl; Midazolam; Contrast dye; Hydrocodone; Metoclopramide; Sulfa antibiotics; Zolpidem; and Penicillins    Past Medical History:   Diagnosis Date   • Disease of thyroid gland    • Hyperlipidemia    • Hypertension    • Kidney stone    • Pneumonia        Social History     Social History   • Marital status:      Spouse name: N/A   • Number of children: N/A   • Years of education: N/A     Occupational History   • Not on file.     Social History Main Topics   • Smoking status: Never Smoker   • Smokeless tobacco: Never Used   • Alcohol use No   • Drug use: Unknown   • Sexual activity: Defer     Other Topics Concern   • Not on file     Social History Narrative   • No narrative on file       Family History   Problem Relation Age of Onset   • Heart disease Mother    • Cancer Mother    • Hypertension Mother    • Hyperlipidemia Mother    • Heart disease Father    • Cancer Father    • Hyperlipidemia Father    • Hypertension Father        Review of Systems   Constitutional: Positive for diaphoresis and fatigue.   HENT: Positive for hearing loss.    Eyes: Positive for visual disturbance (wears glasses).   Respiratory: Positive for shortness of breath (with activity and at rest).    Cardiovascular: Positive for palpitations and leg swelling. Negative for chest pain.   Gastrointestinal: Positive for vomiting.   Endocrine: Negative.    Genitourinary: Negative.    Musculoskeletal: Positive for arthralgias, back pain, myalgias and neck pain ( squeezing pain relieved by Clonidine  ).   Skin: Negative.    Allergic/Immunologic: Positive for environmental allergies.   Neurological: Negative.    Hematological: Bruises/bleeds easily (both).   Psychiatric/Behavioral: Positive for agitation and sleep disturbance (wakes up smothering/soa PCP referring to pulmonologist). The patient is nervous/anxious.    All other systems reviewed and are negative.      Objective   Vitals:    08/21/18 1541   BP: 132/75  "  BP Location: Right arm   Patient Position: Sitting   Pulse: 82   SpO2: 93%   Weight: 83.1 kg (183 lb 3.2 oz)   Height: 160 cm (63\")      /75 (BP Location: Right arm, Patient Position: Sitting)   Pulse 82   Ht 160 cm (63\")   Wt 83.1 kg (183 lb 3.2 oz)   SpO2 93%   BMI 32.45 kg/m²     Lab Results (most recent)     None          Physical Exam   Constitutional: She is oriented to person, place, and time. She appears well-developed and well-nourished. No distress.   HENT:   Head: Normocephalic and atraumatic.   Eyes: Pupils are equal, round, and reactive to light. EOM are normal.   Neck: No JVD present.   Cardiovascular: Normal rate, regular rhythm, normal heart sounds and intact distal pulses.  Exam reveals no gallop and no friction rub.    No murmur heard.  Pulmonary/Chest: Effort normal and breath sounds normal. No respiratory distress. She has no wheezes. She has no rales. She exhibits no tenderness.   Musculoskeletal: Normal range of motion. She exhibits no edema.   Neurological: She is alert and oriented to person, place, and time. No cranial nerve deficit.   Skin: Skin is warm and dry. No rash noted. No erythema. No pallor.   Psychiatric: She has a normal mood and affect. Her behavior is normal.   Nursing note and vitals reviewed.      Procedure   Procedures       Assessment/Plan     Problems Addressed this Visit        Cardiovascular and Mediastinum    Essential hypertension    Coronary artery disease involving native coronary artery of native heart without angina pectoris       Respiratory    Shortness of breath - Primary            Recommendation   1.  Patient with abnormal stress test demonstrating inferior ischemia with no chest pain but moderate levels of dyspnea.  We discussed definitive evaluation to include cardiac catheterization which she does not want at this time.  She would rather watch symptoms and try to recover further from surgery and increased functional status.  Because this is not " a high risk stress test, we will continue to monitor.  However, I discussed extensively that if she develops chest pain or worsening dyspnea, she is to contact our office.  2.  I would like to see her back for follow-up in one to 2 months otherwise.  We can direct further that time.  If she develops any chest pain not resolved by nitroglycerin, she'll go to the ER.  She'll follow-up with primary as scheduled              Patient's Body mass index is 32.45 kg/m². BMI is above normal parameters. Recommendations include: educational material.       Electronically signed by:

## 2019-02-13 ENCOUNTER — LAB (OUTPATIENT)
Dept: CARDIOLOGY | Facility: CLINIC | Age: 64
End: 2019-02-13

## 2019-02-13 ENCOUNTER — OFFICE VISIT (OUTPATIENT)
Dept: CARDIOLOGY | Facility: CLINIC | Age: 64
End: 2019-02-13

## 2019-02-13 VITALS
BODY MASS INDEX: 29.84 KG/M2 | SYSTOLIC BLOOD PRESSURE: 188 MMHG | HEART RATE: 78 BPM | OXYGEN SATURATION: 94 % | DIASTOLIC BLOOD PRESSURE: 85 MMHG | HEIGHT: 63 IN | WEIGHT: 168.4 LBS

## 2019-02-13 DIAGNOSIS — I25.10 CORONARY ARTERY DISEASE INVOLVING NATIVE CORONARY ARTERY OF NATIVE HEART WITHOUT ANGINA PECTORIS: ICD-10-CM

## 2019-02-13 DIAGNOSIS — R06.02 SHORTNESS OF BREATH: ICD-10-CM

## 2019-02-13 DIAGNOSIS — R94.39 ABNORMAL STRESS TEST: ICD-10-CM

## 2019-02-13 DIAGNOSIS — R09.89 LABILE HYPERTENSION: ICD-10-CM

## 2019-02-13 DIAGNOSIS — R09.89 LABILE HYPERTENSION: Primary | ICD-10-CM

## 2019-02-13 PROCEDURE — 99214 OFFICE O/P EST MOD 30 MIN: CPT | Performed by: PHYSICIAN ASSISTANT

## 2019-02-13 RX ORDER — LIDOCAINE AND PRILOCAINE 25; 25 MG/G; MG/G
CREAM TOPICAL
COMMUNITY
End: 2019-03-11

## 2019-02-13 RX ORDER — AMLODIPINE BESYLATE 5 MG/1
5 TABLET ORAL 2 TIMES DAILY
Qty: 60 TABLET | Refills: 6 | Status: SHIPPED | OUTPATIENT
Start: 2019-02-13 | End: 2019-03-11 | Stop reason: SDUPTHER

## 2019-02-13 RX ORDER — AMLODIPINE BESYLATE 5 MG/1
TABLET ORAL DAILY
Refills: 3 | COMMUNITY
Start: 2018-12-05 | End: 2019-02-13 | Stop reason: SDUPTHER

## 2019-02-13 RX ORDER — FAMOTIDINE 20 MG/1
TABLET, FILM COATED ORAL
Qty: 3 TABLET | Refills: 0 | Status: SHIPPED | OUTPATIENT
Start: 2019-02-13

## 2019-02-13 RX ORDER — PREDNISONE 20 MG/1
TABLET ORAL
Qty: 3 TABLET | Refills: 0 | Status: SHIPPED | OUTPATIENT
Start: 2019-02-13 | End: 2019-03-11

## 2019-02-13 RX ORDER — TRAMADOL HYDROCHLORIDE 50 MG/1
TABLET ORAL
Refills: 1 | COMMUNITY
Start: 2019-01-30

## 2019-02-13 RX ORDER — DIPHENHYDRAMINE HCL 25 MG
TABLET ORAL
Qty: 3 TABLET | Refills: 0 | Status: SHIPPED | OUTPATIENT
Start: 2019-02-13

## 2019-02-13 RX ORDER — DICLOFENAC SODIUM 75 MG/1
75 TABLET, DELAYED RELEASE ORAL 2 TIMES DAILY
Qty: 20 TABLET | Refills: 0 | Status: SHIPPED | OUTPATIENT
Start: 2019-02-13 | End: 2019-03-11

## 2019-02-13 RX ORDER — ATORVASTATIN CALCIUM 40 MG/1
40 TABLET, FILM COATED ORAL DAILY
Qty: 30 TABLET | Refills: 11 | Status: SHIPPED | OUTPATIENT
Start: 2019-02-13 | End: 2019-03-11 | Stop reason: SDUPTHER

## 2019-02-13 NOTE — PROGRESS NOTES
Problem list     Subjective   Keara Ortiz is a 63 y.o. female     Chief Complaint   Patient presents with   • Follow-up     presents for f/u   • Palpitations   • Shortness of Breath   • Coronary Artery Disease       HPI         Problem List:     1. Minor nonobstructive CAD per cath in 2006.  1.1 Left heart catheter 7/27/06-20% proximal narrowing to the circumflex, EF 65-70%, left ventricular end-diastolic pressure 25  1.2 Stress test 10/13/14-low risk, no evidence of ischemia 1.3 stress test July 2018 demonstrated inferior ischemia and preserved LV function    2. Hypertension  3. Dyslipidemia  4. Diabetes mellitus  5. CVA   5.1 CT Head 5/31/18 - mod remote microvascular ischemic changes in the periventricular white matter with no acute abnormality or change   6. Shortness of breath 10/13/14-EF 60-65%, mild LVH, mild diastolic dysfunction, revealed to mild TR, mild posterior pericardial effusion which may extend minimally to the lateral wall, PA 30-35  7. History of pancreatitis      Patient is a 63-year-old female that presents back to the office for follow-up.    Patient has been followed by primary.  She recently had surgery because of adhesions and small bowel obstruction.  She has been recovering from surgery from several weeks ago.  She describes to me having difficulty with blood pressure control.  In fact, she is on so many different blood pressure medications.  This includes 800 mg of labetalol, 80 mg of lisinopril, 5 g of amlodipine, clonidine 0.1 mg 3 times a day and a clonidine patch and continues to have significant hypertension.  Patient is sent to have CT of the abdomen performed which will hopefully exclude renal artery stenosis.  She will also have workup by pulmonology for obstructive sleep apnea.    Patient does not describe any chest discomfort other than from recent file.  Recently, she was prescribed spironolactone.  She describes that when she took the medication she would have syncope.   She fell and injured some of her ribs.  She also describes another fall after taking that medication and hit her head.  She is currently not on that medication.    She does experience moderate levels of dyspnea.  This is not worsened or progressed.However, he has remained persistent.  She has no PND orthopnea.  She doesn't palpitate or have dysrhythmic symptoms.  She is concerned about her abnormal stress test.  She describes having family members having myocardial infarctions.  Otherwise she is doing well      Outpatient Encounter Medications as of 2/13/2019   Medication Sig Dispense Refill   • amLODIPine (NORVASC) 5 MG tablet Take 1 tablet by mouth 2 (Two) Times a Day. 60 tablet 6   • asenapine maleate (SAPHRIS) 10 MG sublingual tablet sublingual tablet Saphris (black cherry) 10 mg sublingual tablet     • aspirin 81 MG EC tablet Take 81 mg by mouth Daily.     • busPIRone (BUSPAR) 10 MG tablet Take 10 mg by mouth 3 (Three) Times a Day.     • CloNIDine (CATAPRES) 0.1 MG tablet Take 1 tablet by mouth 3 (Three) Times a Day.     • CloNIDine (CATAPRES-TTS) 0.3 MG/24HR patch clonidine 0.3 mg/24 hr weekly transdermal patch     • dicyclomine (BENTYL) 10 MG capsule Take 10 mg by mouth 4 (Four) Times a Day Before Meals & at Bedtime.     • furosemide (LASIX) 80 MG tablet Take 20 mg by mouth 2 (Two) Times a Day.     • gabapentin (NEURONTIN) 800 MG tablet Take 800 mg by mouth 3 (Three) Times a Day.     • labetalol (TRANDATE) 100 MG tablet Take 400 mg by mouth 2 (Two) Times a Day. Two tablets twice daily.     • lamoTRIgine (LaMICtal) 100 MG tablet Take 100 mg by mouth 2 (Two) Times a Day.     • lidocaine-prilocaine (EMLA) 2.5-2.5 % cream Apply  topically to the appropriate area as directed Every 2 (Two) Hours As Needed for Mild Pain .     • lisinopril (PRINIVIL,ZESTRIL) 40 MG tablet Take 40 mg by mouth 2 (Two) Times a Day.     • nitroglycerin (NITROSTAT) 0.4 MG SL tablet Place  under the tongue.     • OLANZapine (zyPREXA) 10 MG  tablet Take 10 mg by mouth Daily.     • omeprazole (priLOSEC) 20 MG capsule Take 20 mg by mouth Daily.     • ondansetron (ZOFRAN) 4 MG tablet Take 4 mg by mouth Every 8 (Eight) Hours As Needed for Nausea or Vomiting.     • pancrelipase, Lip-Prot-Amyl, (CREON) 48370-06998 units capsule delayed-release particles capsule Take 24,000 units of lipase by mouth 3 (Three) Times a Day With Meals.     • promethazine (PHENERGAN) 25 MG tablet Take 25 mg by mouth As Needed for Nausea or Vomiting.     • TiZANidine (ZANAFLEX) 4 MG capsule Take 4 mg by mouth 3 (Three) Times a Day.     • traMADol (ULTRAM) 50 MG tablet TAKE 1 TABLET BY MOUTH EVERY 4 TO 6 HOURS AS NEEDED FOR PAIN  1   • [DISCONTINUED] amLODIPine (NORVASC) 5 MG tablet Take  by mouth Daily.  3   • atorvastatin (LIPITOR) 40 MG tablet Take 1 tablet by mouth Daily. 30 tablet 11   • oxyCODONE-acetaminophen (PERCOCET)  MG per tablet Take 1 tablet by mouth As Needed for Moderate Pain .     • [DISCONTINUED] isosorbide mononitrate (IMDUR) 30 MG 24 hr tablet Take 1/2 tablet daily 90 tablet 3     No facility-administered encounter medications on file as of 2/13/2019.        Fentanyl; Midazolam; Contrast dye; Hydrocodone; Metoclopramide; Spironolactone; Sulfa antibiotics; Zolpidem; and Penicillins    Past Medical History:   Diagnosis Date   • Bowel obstruction (CMS/HCC)    • Disease of thyroid gland    • Hyperlipidemia    • Hypertension    • Kidney stone    • Pneumonia        Social History     Socioeconomic History   • Marital status:      Spouse name: Not on file   • Number of children: Not on file   • Years of education: Not on file   • Highest education level: Not on file   Social Needs   • Financial resource strain: Not on file   • Food insecurity - worry: Not on file   • Food insecurity - inability: Not on file   • Transportation needs - medical: Not on file   • Transportation needs - non-medical: Not on file   Occupational History   • Not on file   Tobacco Use  "  • Smoking status: Never Smoker   • Smokeless tobacco: Never Used   Substance and Sexual Activity   • Alcohol use: No   • Drug use: Defer   • Sexual activity: Defer   Other Topics Concern   • Not on file   Social History Narrative   • Not on file       Family History   Problem Relation Age of Onset   • Heart disease Mother    • Cancer Mother    • Hypertension Mother    • Hyperlipidemia Mother    • Heart disease Father    • Cancer Father    • Hyperlipidemia Father    • Hypertension Father        Review of Systems   Constitutional: Positive for diaphoresis (night sweats) and fatigue.   HENT: Negative.    Eyes: Positive for visual disturbance (wears glasses).   Respiratory: Positive for shortness of breath (on exertion).    Cardiovascular: Positive for palpitations and leg swelling. Negative for chest pain.   Gastrointestinal: Positive for diarrhea.   Endocrine: Negative.    Genitourinary: Negative.    Musculoskeletal: Positive for arthralgias, back pain, myalgias and neck pain (feels squeezing in neck).   Skin: Positive for wound (bowel resection).   Allergic/Immunologic: Negative.    Neurological: Positive for syncope.   Hematological: Bruises/bleeds easily.   Psychiatric/Behavioral: Positive for agitation. The patient is nervous/anxious.    All other systems reviewed and are negative.      Objective   Vitals:    02/13/19 1253   BP: (!) 188/85   BP Location: Left arm   Patient Position: Sitting   Pulse: 78   SpO2: 94%   Weight: 76.4 kg (168 lb 6.4 oz)   Height: 160 cm (63\")      BP (!) 188/85 (BP Location: Left arm, Patient Position: Sitting)   Pulse 78   Ht 160 cm (63\")   Wt 76.4 kg (168 lb 6.4 oz)   SpO2 94%   BMI 29.83 kg/m²     Lab Results (most recent)     None          Physical Exam   Constitutional: She is oriented to person, place, and time. She appears well-developed and well-nourished. No distress.   HENT:   Head: Normocephalic and atraumatic.   Eyes: EOM are normal. Pupils are equal, round, and " reactive to light.   Neck: No JVD present.   Cardiovascular: Normal rate, regular rhythm, normal heart sounds and intact distal pulses. Exam reveals no gallop and no friction rub.   No murmur heard.  Pulmonary/Chest: Effort normal and breath sounds normal. No respiratory distress. She has no wheezes. She has no rales. She exhibits no tenderness.   Musculoskeletal: Normal range of motion. She exhibits no edema.   Neurological: She is alert and oriented to person, place, and time. No cranial nerve deficit.   Skin: Skin is warm and dry. No rash noted. No erythema. No pallor.   Psychiatric: She has a normal mood and affect. Her behavior is normal.   Nursing note and vitals reviewed.      Procedure   Procedures       Assessment/Plan     Problems Addressed this Visit        Cardiovascular and Mediastinum    Coronary artery disease involving native coronary artery of native heart without angina pectoris    Relevant Medications    amLODIPine (NORVASC) 5 MG tablet    Other Relevant Orders    [facility] Cath    CBC & Differential    Comprehensive Metabolic Panel    Labile hypertension - Primary    Relevant Medications    amLODIPine (NORVASC) 5 MG tablet    Other Relevant Orders    [facility] Cath    CBC & Differential    Comprehensive Metabolic Panel    Abnormal stress test    Relevant Orders    [facility]    CBC & Differential    Comprehensive Metabolic Panel       Respiratory    Shortness of breath    Relevant Orders    [facility]    CBC & Differential    Comprehensive Metabolic Panel            Recommendation  1.  Patient with labile hypertension but is undergoing secondary workup.  I am increasing amlodipine to twice a day dosing but it is concerning that she is on so many medications and continues to have significant hypertension.  Hopefully secondary workup will be able to demonstrate some pathology.  In the interim, I am increasing amlodipine to 5 mg twice a day.  2.  Patient with abnormal  stress test in July 2018 demonstrating inferior ischemia.  She has shortness of breath and forces me today that she is very concerned because of her family history.  She would like definitive evaluation and with her diabetic status, we will schedule for catheterization in the setting that dyspnea could represent an anginal equivalent.  3.  I will continue aspirin therapy.  I am adding a atorvastatin for lipid management.  We will see her back for follow-up after catheterization.  She'll follow-up primary as scheduled               Patient's Body mass index is 29.83 kg/m². BMI is within normal parameters. No follow-up required..       Electronically signed by:

## 2019-03-04 ENCOUNTER — OUTSIDE FACILITY SERVICE (OUTPATIENT)
Dept: CARDIOLOGY | Facility: CLINIC | Age: 64
End: 2019-03-04

## 2019-03-04 PROCEDURE — 93458 L HRT ARTERY/VENTRICLE ANGIO: CPT | Performed by: INTERNAL MEDICINE

## 2019-03-11 ENCOUNTER — OFFICE VISIT (OUTPATIENT)
Dept: CARDIOLOGY | Facility: CLINIC | Age: 64
End: 2019-03-11

## 2019-03-11 VITALS
HEIGHT: 63 IN | DIASTOLIC BLOOD PRESSURE: 81 MMHG | WEIGHT: 166 LBS | HEART RATE: 103 BPM | OXYGEN SATURATION: 95 % | BODY MASS INDEX: 29.41 KG/M2 | SYSTOLIC BLOOD PRESSURE: 152 MMHG

## 2019-03-11 DIAGNOSIS — E78.00 PURE HYPERCHOLESTEROLEMIA: ICD-10-CM

## 2019-03-11 DIAGNOSIS — R00.2 PALPITATIONS: ICD-10-CM

## 2019-03-11 DIAGNOSIS — I25.10 CORONARY ARTERY DISEASE INVOLVING NATIVE CORONARY ARTERY OF NATIVE HEART WITHOUT ANGINA PECTORIS: ICD-10-CM

## 2019-03-11 DIAGNOSIS — R06.02 SHORTNESS OF BREATH: Primary | ICD-10-CM

## 2019-03-11 PROCEDURE — 99213 OFFICE O/P EST LOW 20 MIN: CPT | Performed by: PHYSICIAN ASSISTANT

## 2019-03-11 RX ORDER — ATORVASTATIN CALCIUM 40 MG/1
40 TABLET, FILM COATED ORAL DAILY
Qty: 90 TABLET | Refills: 3 | Status: SHIPPED | OUTPATIENT
Start: 2019-03-11 | End: 2020-02-04

## 2019-03-11 RX ORDER — AMLODIPINE BESYLATE 5 MG/1
5 TABLET ORAL 2 TIMES DAILY
Qty: 180 TABLET | Refills: 3 | Status: SHIPPED | OUTPATIENT
Start: 2019-03-11

## 2019-03-11 NOTE — PATIENT INSTRUCTIONS
"Fat and Cholesterol Restricted Diet  Getting too much fat and cholesterol in your diet may cause health problems. Following this diet helps keep your fat and cholesterol at normal levels. This can keep you from getting sick.  What types of fat should I choose?  · Choose monosaturated and polyunsaturated fats. These are found in foods such as olive oil, canola oil, flaxseeds, walnuts, almonds, and seeds.  · Eat more omega-3 fats. Good choices include salmon, mackerel, sardines, tuna, flaxseed oil, and ground flaxseeds.  · Limit saturated fats. These are in animal products such as meats, butter, and cream. They can also be in plant products such as palm oil, palm kernel oil, and coconut oil.  · Avoid foods with partially hydrogenated oils in them. These contain trans fats. Examples of foods that have trans fats are stick margarine, some tub margarines, cookies, crackers, and other baked goods.  What general guidelines do I need to follow?  · Check food labels. Look for the words \"trans fat\" and \"saturated fat.\"  · When preparing a meal:  ? Fill half of your plate with vegetables and green salads.  ? Fill one fourth of your plate with whole grains. Look for the word \"whole\" as the first word in the ingredient list.  ? Fill one fourth of your plate with lean protein foods.  · Eat more foods that have fiber, like apples, carrots, beans, peas, and barley.  · Eat more home-cooked foods. Eat less at restaurants and buffets.  · Limit or avoid alcohol.  · Limit foods high in starch and sugar.  · Limit fried foods.  · Cook foods without frying them. Baking, boiling, grilling, and broiling are all great options.  · Lose weight if you are overweight. Losing even a small amount of weight can help your overall health. It can also help prevent diseases such as diabetes and heart disease.  What foods can I eat?  Grains  Whole grains, such as whole wheat or whole grain breads, crackers, cereals, and pasta. Unsweetened oatmeal, " bulgur, barley, quinoa, or brown rice. Corn or whole wheat flour tortillas.  Vegetables  Fresh or frozen vegetables (raw, steamed, roasted, or grilled). Green salads.  Fruits  All fresh, canned (in natural juice), or frozen fruits.  Meat and Other Protein Products  Ground beef (85% or leaner), grass-fed beef, or beef trimmed of fat. Skinless chicken or turkey. Ground chicken or turkey. Pork trimmed of fat. All fish and seafood. Eggs. Dried beans, peas, or lentils. Unsalted nuts or seeds. Unsalted canned or dry beans.  Dairy  Low-fat dairy products, such as skim or 1% milk, 2% or reduced-fat cheeses, low-fat ricotta or cottage cheese, or plain low-fat yogurt.  Fats and Oils  Tub margarines without trans fats. Light or reduced-fat mayonnaise and salad dressings. Avocado. Olive, canola, sesame, or safflower oils. Natural peanut or almond butter (choose ones without added sugar and oil).  The items listed above may not be a complete list of recommended foods or beverages. Contact your dietitian for more options.  What foods are not recommended?  Grains  White bread. White pasta. White rice. Cornbread. Bagels, pastries, and croissants. Crackers that contain trans fat.  Vegetables  White potatoes. Corn. Creamed or fried vegetables. Vegetables in a cheese sauce.  Fruits  Dried fruits. Canned fruit in light or heavy syrup. Fruit juice.  Meat and Other Protein Products  Fatty cuts of meat. Ribs, chicken wings, castro, sausage, bologna, salami, chitterlings, fatback, hot dogs, bratwurst, and packaged luncheon meats. Liver and organ meats.  Dairy  Whole or 2% milk, cream, half-and-half, and cream cheese. Whole milk cheeses. Whole-fat or sweetened yogurt. Full-fat cheeses. Nondairy creamers and whipped toppings. Processed cheese, cheese spreads, or cheese curds.  Sweets and Desserts  Corn syrup, sugars, honey, and molasses. Candy. Jam and jelly. Syrup. Sweetened cereals. Cookies, pies, cakes, donuts, muffins, and ice  cream.  Fats and Oils  Butter, stick margarine, lard, shortening, ghee, or castro fat. Coconut, palm kernel, or palm oils.  Beverages  Alcohol. Sweetened drinks (such as sodas, lemonade, and fruit drinks or punches).  The items listed above may not be a complete list of foods and beverages to avoid. Contact your dietitian for more information.  This information is not intended to replace advice given to you by your health care provider. Make sure you discuss any questions you have with your health care provider.  Document Released: 06/18/2013 Document Revised: 08/24/2017 Document Reviewed: 03/19/2015  [employer] Interactive Patient Education © 2018 [employer]  BMI for Adults  Body mass index (BMI) is a number that is calculated from a person's weight and height. In most adults, the number is used to find how much of an adult's weight is made up of fat. BMI is not as accurate as a direct measure of body fat.  How is BMI calculated?  BMI is calculated by dividing weight in kilograms by height in meters squared. It can also be calculated by dividing weight in pounds by height in inches squared, then multiplying the resulting number by 703. Charts are available to help you find your BMI quickly and easily without doing this calculation.  How is BMI interpreted?  Health care professionals use BMI charts to identify whether an adult is underweight, at a normal weight, or overweight based on the following guidelines:  · Underweight: BMI less than 18.5.  · Normal weight: BMI between 18.5 and 24.9.  · Overweight: BMI between 25 and 29.9.  · Obese: BMI of 30 and above.    BMI is usually interpreted the same for males and females.  Weight includes both fat and muscle, so someone with a muscular build, such as an athlete, may have a BMI that is higher than 24.9. In cases like these, BMI may not accurately depict body fat. To determine if excess body fat is the cause of a BMI of 25 or higher, further assessments may need to be  done by a health care provider.  Why is BMI a useful tool?  BMI is used to identify a possible weight problem that may be related to a medical problem or may increase the risk for medical problems. BMI can also be used to promote changes to reach a healthy weight.  This information is not intended to replace advice given to you by your health care provider. Make sure you discuss any questions you have with your health care provider.  Document Released: 08/29/2005 Document Revised: 04/27/2017 Document Reviewed: 05/15/2015  [employer] Interactive Patient Education © 2018 [employer]

## 2019-03-11 NOTE — PROGRESS NOTES
Problem list     Subjective   Keara Ortiz is a 63 y.o. female     Chief Complaint   Patient presents with   • Hypertension     Here to follow up on heart cath done on 3/4/19   • Shortness of Breath       HPI      Problem List:     1. Minor nonobstructive CAD per cath in 2006.  1.1 Left heart catheter 7/27/06-20% proximal narrowing to the circumflex, EF 65-70%, left ventricular end-diastolic pressure 25  1.2 Stress test 10/13/14-low risk, no evidence of ischemia 1.3 stress test July 2018 demonstrated inferior ischemia and preserved LV function  1.3 cardiac catheterization March 2019 demonstrated 30-50% LAD disease but nonobstructive disease otherwise and preserved LV function2. Hypertension  3. Dyslipidemia  4. Diabetes mellitus  5. CVA   5.1 CT Head 5/31/18 - mod remote microvascular ischemic changes in the periventricular white matter with no acute abnormality or change   6. Shortness of breath 10/13/14-EF 60-65%, mild LVH, mild diastolic dysfunction, revealed to mild TR, mild posterior pericardial effusion which may extend minimally to the lateral wall, PA 30-35  7. History of pancreatitis      Patient is a 63-year-old female who presents back from cardiac catheterization.  She is doing well.  She has no chest discomfort.  She has very mild levels of exertional dyspnea and usually only with prolonged levels of activity.  She otherwise is quite active without symptoms.  She has no PND orthopnea.    She will palpitated on occasion.  She has no dizziness presyncope or syncope.    Her blood pressure is actually better today.  Patient has had a secondary workup performed.  She recently describes CT of the abdomen to look for renal artery stenosis which was unremarkable.  She is soon to see pulmonology and apparently undergo evaluation for sleep apnea.      Outpatient Encounter Medications as of 3/11/2019   Medication Sig Dispense Refill   • amLODIPine (NORVASC) 5 MG tablet Take 1 tablet by mouth 2 (Two) Times a  Day. 180 tablet 3   • asenapine maleate (SAPHRIS) 10 MG sublingual tablet sublingual tablet Saphris (black cherry) 10 mg sublingual tablet     • aspirin 81 MG EC tablet Take 81 mg by mouth Daily.     • atorvastatin (LIPITOR) 40 MG tablet Take 1 tablet by mouth Daily. 90 tablet 3   • busPIRone (BUSPAR) 10 MG tablet Take 10 mg by mouth 3 (Three) Times a Day.     • CloNIDine (CATAPRES) 0.1 MG tablet Take 1 tablet by mouth 3 (Three) Times a Day.     • CloNIDine (CATAPRES-TTS) 0.3 MG/24HR patch clonidine 0.3 mg/24 hr weekly transdermal patch     • dicyclomine (BENTYL) 10 MG capsule Take 10 mg by mouth 4 (Four) Times a Day Before Meals & at Bedtime.     • diphenhydrAMINE (BENADRYL ALLERGY) 25 MG tablet Take BID the day before the cath and q tab the day of the cath. 3 tablet 0   • famotidine (PEPCID) 20 MG tablet BID the day before the cath and the am of the cath 3 tablet 0   • furosemide (LASIX) 80 MG tablet Take 80 mg by mouth Daily.     • gabapentin (NEURONTIN) 800 MG tablet Take 800 mg by mouth 3 (Three) Times a Day.     • labetalol (TRANDATE) 100 MG tablet Take 400 mg by mouth 2 (Two) Times a Day. Two tablets twice daily.     • lamoTRIgine (LaMICtal) 100 MG tablet Take 100 mg by mouth 2 (Two) Times a Day.     • lisinopril (PRINIVIL,ZESTRIL) 40 MG tablet Take 40 mg by mouth 2 (Two) Times a Day.     • nitroglycerin (NITROSTAT) 0.4 MG SL tablet Place  under the tongue.     • OLANZapine (zyPREXA) 10 MG tablet Take 10 mg by mouth Daily.     • omeprazole (priLOSEC) 20 MG capsule Take 20 mg by mouth Daily.     • ondansetron (ZOFRAN) 4 MG tablet Take 4 mg by mouth Every 8 (Eight) Hours As Needed for Nausea or Vomiting.     • oxyCODONE-acetaminophen (PERCOCET)  MG per tablet Take 1 tablet by mouth As Needed for Moderate Pain .     • pancrelipase, Lip-Prot-Amyl, (CREON) 41371-99495 units capsule delayed-release particles capsule Take 24,000 units of lipase by mouth 3 (Three) Times a Day With Meals.     • promethazine  (PHENERGAN) 25 MG tablet Take 25 mg by mouth As Needed for Nausea or Vomiting.     • TiZANidine (ZANAFLEX) 4 MG capsule Take 4 mg by mouth 3 (Three) Times a Day.     • traMADol (ULTRAM) 50 MG tablet TAKE 1 TABLET BY MOUTH EVERY 4 TO 6 HOURS AS NEEDED FOR PAIN  1   • [DISCONTINUED] amLODIPine (NORVASC) 5 MG tablet Take 1 tablet by mouth 2 (Two) Times a Day. 60 tablet 6   • [DISCONTINUED] atorvastatin (LIPITOR) 40 MG tablet Take 1 tablet by mouth Daily. 30 tablet 11   • [DISCONTINUED] diclofenac (VOLTAREN) 75 MG EC tablet Take 1 tablet by mouth 2 (Two) Times a Day. 20 tablet 0   • [DISCONTINUED] lidocaine-prilocaine (EMLA) 2.5-2.5 % cream Apply  topically to the appropriate area as directed Every 2 (Two) Hours As Needed for Mild Pain .     • [DISCONTINUED] predniSONE (DELTASONE) 20 MG tablet BID the day before the cath and the am of the cath 3 tablet 0     No facility-administered encounter medications on file as of 3/11/2019.        Fentanyl; Midazolam; Contrast dye; Hydrocodone; Metoclopramide; Spironolactone; Sulfa antibiotics; Zolpidem; and Penicillins    Past Medical History:   Diagnosis Date   • Bowel obstruction (CMS/HCC)    • Disease of thyroid gland    • Hyperlipidemia    • Hypertension    • Kidney stone    • Pneumonia        Social History     Socioeconomic History   • Marital status:      Spouse name: Not on file   • Number of children: Not on file   • Years of education: Not on file   • Highest education level: Not on file   Social Needs   • Financial resource strain: Not on file   • Food insecurity - worry: Not on file   • Food insecurity - inability: Not on file   • Transportation needs - medical: Not on file   • Transportation needs - non-medical: Not on file   Occupational History   • Not on file   Tobacco Use   • Smoking status: Never Smoker   • Smokeless tobacco: Never Used   Substance and Sexual Activity   • Alcohol use: No   • Drug use: Defer   • Sexual activity: Defer   Other Topics Concern  "  • Not on file   Social History Narrative   • Not on file       Family History   Problem Relation Age of Onset   • Heart disease Mother    • Cancer Mother    • Hypertension Mother    • Hyperlipidemia Mother    • Heart disease Father    • Cancer Father    • Hyperlipidemia Father    • Hypertension Father        Review of Systems   Constitutional: Negative for chills, fatigue and fever.   HENT: Negative.    Eyes: Positive for visual disturbance (Glasses daily ).   Respiratory: Positive for shortness of breath. Negative for chest tightness.    Cardiovascular: Positive for palpitations (Occasional ) and leg swelling (Lower extremity edema. Does go down some overnight, but doesn't totally resolve ). Negative for chest pain.   Gastrointestinal: Positive for abdominal pain (Constant abdominal pain in right upper abdomen. ). Negative for nausea and vomiting.   Endocrine: Positive for cold intolerance (Stays cold most of the time ). Negative for heat intolerance.   Genitourinary: Negative.    Musculoskeletal: Positive for arthralgias (Joints ). Negative for back pain.   Skin: Positive for wound (Open wound in abdomen from previous surgery ). Negative for rash.   Allergic/Immunologic: Negative.  Negative for environmental allergies and food allergies.   Neurological: Negative.  Negative for dizziness, weakness and light-headedness.   Hematological: Bruises/bleeds easily (Bruises and bleeds easily ).   Psychiatric/Behavioral: Negative for sleep disturbance (Denies waking with smothering or SOA).   All other systems reviewed and are negative.      Objective   Vitals:    03/11/19 0908   BP: 152/81   BP Location: Left arm   Patient Position: Sitting   Pulse: 103   SpO2: 95%   Weight: 75.3 kg (166 lb)   Height: 160 cm (63\")      /81 (BP Location: Left arm, Patient Position: Sitting)   Pulse 103   Ht 160 cm (63\")   Wt 75.3 kg (166 lb)   SpO2 95%   BMI 29.41 kg/m²     Lab Results (most recent)     None          Physical " Exam   Constitutional: She is oriented to person, place, and time. She appears well-developed and well-nourished. No distress.   HENT:   Head: Normocephalic and atraumatic.   Eyes: EOM are normal. Pupils are equal, round, and reactive to light.   Neck: No JVD present.   Cardiovascular: Normal rate, regular rhythm, normal heart sounds and intact distal pulses. Exam reveals no gallop and no friction rub.   No murmur heard.  Grade 1/6 to 2/6 systolic murmur right upper sternal border.  Grade 2/6 systolic murmur left upper sternal border   Pulmonary/Chest: Effort normal and breath sounds normal. No respiratory distress. She has no wheezes. She has no rales. She exhibits no tenderness.   Musculoskeletal: Normal range of motion. She exhibits edema.   Neurological: She is alert and oriented to person, place, and time. No cranial nerve deficit.   Skin: Skin is warm and dry. No rash noted. No erythema. No pallor.   Psychiatric: She has a normal mood and affect. Her behavior is normal.       Procedure   Procedures       Assessment/Plan     Problems Addressed this Visit        Cardiovascular and Mediastinum    Coronary artery disease involving native coronary artery of native heart without angina pectoris    Relevant Medications    amLODIPine (NORVASC) 5 MG tablet    Other Relevant Orders    Comprehensive Metabolic Panel    Lipid Panel    Pure hypercholesterolemia    Relevant Medications    atorvastatin (LIPITOR) 40 MG tablet    Other Relevant Orders    Comprehensive Metabolic Panel    Lipid Panel    Palpitations    Relevant Orders    Comprehensive Metabolic Panel    Lipid Panel       Respiratory    Shortness of breath - Primary    Relevant Orders    Comprehensive Metabolic Panel    Lipid Panel              Recommendation  1.  Patient with nonobstructive coronary artery disease.  We will continue current medical regimen for now she has no ischemic symptoms.  I would like to recheck lipid parameters and she will continue  "moderate dose statin.  2.  Otherwise, patient is on multiple blood pressure medications and is undergoing secondary workup.  For now, we will continue current medical therapy.  I did discuss for blood pressure \"spikes\" she can take clonidine as needed.  3.  Otherwise we will see her back for follow-up in 6 months or sooner symptoms discussed.  She will follow with primary as scheduled          Patient's Body mass index is 29.41 kg/m². BMI is above normal parameters. Recommendations include: educational material and referral to primary care.       Electronically signed by:    "

## 2019-09-11 ENCOUNTER — TELEPHONE (OUTPATIENT)
Dept: CARDIOLOGY | Facility: CLINIC | Age: 64
End: 2019-09-11

## 2019-09-11 ENCOUNTER — OFFICE VISIT (OUTPATIENT)
Dept: CARDIOLOGY | Facility: CLINIC | Age: 64
End: 2019-09-11

## 2019-09-11 VITALS
SYSTOLIC BLOOD PRESSURE: 147 MMHG | WEIGHT: 195.6 LBS | DIASTOLIC BLOOD PRESSURE: 73 MMHG | BODY MASS INDEX: 34.66 KG/M2 | HEART RATE: 74 BPM | HEIGHT: 63 IN | OXYGEN SATURATION: 93 %

## 2019-09-11 DIAGNOSIS — R07.2 PRECORDIAL PAIN: ICD-10-CM

## 2019-09-11 DIAGNOSIS — I10 ESSENTIAL HYPERTENSION: ICD-10-CM

## 2019-09-11 DIAGNOSIS — I25.84 CORONARY ARTERY DISEASE DUE TO CALCIFIED CORONARY LESION: Primary | ICD-10-CM

## 2019-09-11 DIAGNOSIS — I25.10 CORONARY ARTERY DISEASE DUE TO CALCIFIED CORONARY LESION: Primary | ICD-10-CM

## 2019-09-11 PROCEDURE — 99214 OFFICE O/P EST MOD 30 MIN: CPT | Performed by: PHYSICIAN ASSISTANT

## 2019-09-11 PROCEDURE — 93000 ELECTROCARDIOGRAM COMPLETE: CPT | Performed by: PHYSICIAN ASSISTANT

## 2019-09-11 RX ORDER — METOPROLOL SUCCINATE 100 MG/1
100 TABLET, EXTENDED RELEASE ORAL DAILY
Qty: 90 TABLET | Refills: 3 | Status: SHIPPED | OUTPATIENT
Start: 2019-09-11 | End: 2019-09-11 | Stop reason: SDUPTHER

## 2019-09-11 RX ORDER — METOPROLOL SUCCINATE 100 MG/1
100 TABLET, EXTENDED RELEASE ORAL DAILY
Qty: 90 TABLET | Refills: 3 | Status: SHIPPED | OUTPATIENT
Start: 2019-09-11

## 2019-09-11 RX ORDER — ISOSORBIDE DINITRATE 10 MG/1
10 TABLET ORAL 2 TIMES DAILY
Qty: 180 TABLET | Refills: 3 | Status: SHIPPED | OUTPATIENT
Start: 2019-09-11 | End: 2019-09-11 | Stop reason: ALTCHOICE

## 2019-09-11 RX ORDER — RANOLAZINE 500 MG/1
500 TABLET, EXTENDED RELEASE ORAL 2 TIMES DAILY
Qty: 180 TABLET | Refills: 3 | Status: SHIPPED | OUTPATIENT
Start: 2019-09-11

## 2019-09-11 RX ORDER — ISOSORBIDE MONONITRATE 30 MG/1
30 TABLET, EXTENDED RELEASE ORAL EVERY MORNING
Qty: 90 TABLET | Refills: 3 | Status: SHIPPED | OUTPATIENT
Start: 2019-09-11 | End: 2019-09-11

## 2019-09-11 NOTE — PATIENT INSTRUCTIONS
Obesity, Adult  Obesity is the condition of having too much total body fat. Being overweight or obese means that your weight is greater than what is considered healthy for your body size. Obesity is determined by a measurement called BMI. BMI is an estimate of body fat and is calculated from height and weight. For adults, a BMI of 30 or higher is considered obese.  Obesity can eventually lead to other health concerns and major illnesses, including:  · Stroke.  · Coronary artery disease (CAD).  · Type 2 diabetes.  · Some types of cancer, including cancers of the colon, breast, uterus, and gallbladder.  · Osteoarthritis.  · High blood pressure (hypertension).  · High cholesterol.  · Sleep apnea.  · Gallbladder stones.  · Infertility problems.  What are the causes?  The main cause of obesity is taking in (consuming) more calories than your body uses for energy. Other factors that contribute to this condition may include:  · Being born with genes that make you more likely to become obese.  · Having a medical condition that causes obesity. These conditions include:  ? Hypothyroidism.  ? Polycystic ovarian syndrome (PCOS).  ? Binge-eating disorder.  ? Cushing syndrome.  · Taking certain medicines, such as steroids, antidepressants, and seizure medicines.  · Not being physically active (sedentary lifestyle).  · Living where there are limited places to exercise safely or buy healthy foods.  · Not getting enough sleep.  What increases the risk?  The following factors may increase your risk of this condition:  · Having a family history of obesity.  · Being a woman of -American descent.  · Being a man of  descent.  What are the signs or symptoms?  Having excessive body fat is the main symptom of this condition.  How is this diagnosed?  This condition may be diagnosed based on:  · Your symptoms.  · Your medical history.  · A physical exam. Your health care provider may measure:  ? Your BMI. If you are an adult  with a BMI between 25 and less than 30, you are considered overweight. If you are an adult with a BMI of 30 or higher, you are considered obese.  ? The distances around your hips and your waist (circumferences). These may be compared to each other to help diagnose your condition.  ? Your skinfold thickness. Your health care provider may gently pinch a fold of your skin and measure it.  How is this treated?  Treatment for this condition often includes changing your lifestyle. Treatment may include some or all of the following:  · Dietary changes. Work with your health care provider and a dietitian to set a weight-loss goal that is healthy and reasonable for you. Dietary changes may include eating:  ? Smaller portions. A portion size is the amount of a particular food that is healthy for you to eat at one time. This varies from person to person.  ? Low-calorie or low-fat options.  ? More whole grains, fruits, and vegetables.  · Regular physical activity. This may include aerobic activity (cardio) and strength training.  · Medicine to help you lose weight. Your health care provider may prescribe medicine if you are unable to lose 1 pound a week after 6 weeks of eating more healthily and doing more physical activity.  · Surgery. Surgical options may include gastric banding and gastric bypass. Surgery may be done if:  ? Other treatments have not helped to improve your condition.  ? You have a BMI of 40 or higher.  ? You have life-threatening health problems related to obesity.  Follow these instructions at home:    Eating and drinking    · Follow recommendations from your health care provider about what you eat and drink. Your health care provider may advise you to:  ? Limit fast foods, sweets, and processed snack foods.  ? Choose low-fat options, such as low-fat milk instead of whole milk.  ? Eat 5 or more servings of fruits or vegetables every day.  ? Eat at home more often. This gives you more control over what you  eat.  ? Choose healthy foods when you eat out.  ? Learn what a healthy portion size is.  ? Keep low-fat snacks on hand.  ? Avoid sugary drinks, such as soda, fruit juice, iced tea sweetened with sugar, and flavored milk.  ? Eat a healthy breakfast.  · Drink enough water to keep your urine clear or pale yellow.  · Do not go without eating for long periods of time (do not fast) or follow a fad diet. Fasting and fad diets can be unhealthy and even dangerous.  Physical Activity  · Exercise regularly, as told by your health care provider. Ask your health care provider what types of exercise are safe for you and how often you should exercise.  · Warm up and stretch before being active.  · Cool down and stretch after being active.  · Rest between periods of activity.  Lifestyle  · Limit the time that you spend in front of your TV, computer, or video game system.  · Find ways to reward yourself that do not involve food.  · Limit alcohol intake to no more than 1 drink a day for nonpregnant women and 2 drinks a day for men. One drink equals 12 oz of beer, 5 oz of wine, or 1½ oz of hard liquor.  General instructions  · Keep a weight loss journal to keep track of the food you eat and how much you exercise you get.  · Take over-the-counter and prescription medicines only as told by your health care provider.  · Take vitamins and supplements only as told by your health care provider.  · Consider joining a support group. Your health care provider may be able to recommend a support group.  · Keep all follow-up visits as told by your health care provider. This is important.  Contact a health care provider if:  · You are unable to meet your weight loss goal after 6 weeks of dietary and lifestyle changes.  This information is not intended to replace advice given to you by your health care provider. Make sure you discuss any questions you have with your health care provider.  Document Released: 01/25/2006 Document Revised: 05/22/2017  Document Reviewed: 10/05/2016  [employer] Interactive Patient Education © 2019 [employer]  MyPlate from [employer]    MyPlate is an outline of a general healthy diet based on the 2010 Dietary Guidelines for Americans, from the U.[place]. Department of Agriculture (USDA). It sets guidelines for how much food you should eat from each food group based on your age, sex, and level of physical activity.  What are tips for following MyPlate?  To follow MyPlate recommendations:  · Eat a wide variety of fruits and vegetables, grains, and protein foods.  · Serve smaller portions and eat less food throughout the day.  · Limit portion sizes to avoid overeating.  · Enjoy your food.  · Get at least 150 minutes of exercise every week. This is about 30 minutes each day, 5 or more days per week.  It can be difficult to have every meal look like MyPlate. Think about MyPlate as eating guidelines for an entire day, rather than each individual meal.  Fruits and vegetables  · Make half of your plate fruits and vegetables.  · Eat many different colors of fruits and vegetables each day.  · For a 2,000 calorie daily food plan, eat:  ? 2½ cups of vegetables every day.  ? 2 cups of fruit every day.  · 1 cup is equal to:  ? 1 cup raw or cooked vegetables.  ? 1 cup raw fruit.  ? 1 medium-sized orange, apple, or banana.  ? 1 cup 100% fruit or vegetable juice.  ? 2 cups raw leafy greens, such as lettuce, spinach, or kale.  ? ½ cup dried fruit.  Grains  · One fourth of your plate should be grains.  · Make at least half of the grains you eat each day whole grains.  · For a 2,000 calorie daily food plan, eat 6 oz of grains every day.  · 1 oz is equal to:  ? 1 slice bread.  ? 1 cup cereal.  ? ½ cup cooked rice, cereal, or pasta.  Protein  · One fourth of your plate should be protein.  · Eat a wide variety of protein foods, including meat, poultry, fish, eggs, beans, nuts, and tofu.  · For a 2,000 calorie daily food plan, eat 5½ oz of protein every day.  · 1 oz  is equal to:  ? 1 oz meat, poultry, or fish.  ? ¼ cup cooked beans.  ? 1 egg.  ? ½ oz nuts or seeds.  ? 1 Tbsp peanut butter.  Dairy  · Drink fat-free or low-fat (1%) milk.  · Eat or drink dairy as a side to meals.  · For a 2,000 calorie daily food plan, eat or drink 3 cups of dairy every day.  · 1 cup is equal to:  ? 1 cup milk, yogurt, cottage cheese, or soy milk (soy beverage).  ? 2 oz processed cheese.  ? 1½ oz natural cheese.  Fats, oils, salt, and sugars  · Only small amounts of oils are recommended.  · Avoid foods that are high in calories and low in nutritional value (empty calories), like foods high in fat or added sugars.  · Choose foods that are low in salt (sodium). Choose foods that have less than 140 milligrams (mg) of sodium per serving.  · Drink water instead of sugary drinks. Drink enough water each day to keep your urine pale yellow.  Where to find support  · Work with your health care provider or a nutrition specialist (dietitian) to develop a customized eating plan that is right for you.  · Download an [employer]is (mobile application) to help you track your daily food intake.  Where to find more information  · Go to ChooseMyPlate.gov for more information.  · Learn more and log your daily food intake according to MyPlate using USDA's SuperTracker: www.supertracker.usda.gov  Summary  · MyPlate is a general guideline for healthy eating from the USDA. It is based on the 2010 Dietary Guidelines for Americans.  · In general, fruits and vegetables should take up ½ of your plate, grains should take up ¼ of your plate, and protein should take up ¼ of your plate.  This information is not intended to replace advice given to you by your health care provider. Make sure you discuss any questions you have with your health care provider.  Document Released: 01/06/2009 Document Revised: 03/19/2018 Document Reviewed: 03/19/2018  [employer] Interactive Patient Education © 2019 [employer]

## 2019-09-11 NOTE — PROGRESS NOTES
Problem list     Subjective   Keara Ortiz is a 63 y.o. female     Chief Complaint   Patient presents with   • Coronary Artery Disease     Here for 6 mo. f/u   • Hypertension   • Hyperlipidemia       HPI      Problem List:     1. Minor nonobstructive CAD per cath in 2006.  1.1 Left heart catheter 7/27/06-20% proximal narrowing to the circumflex, EF 65-70%, left ventricular end-diastolic pressure 25  1.2 Stress test 10/13/14-low risk, no evidence of ischemia 1.3 stress test July 2018 demonstrated inferior ischemia and preserved LV function  1.3 cardiac catheterization March 2019 demonstrated 30-50% LAD disease but nonobstructive disease otherwise and preserved LV function2. Hypertension  3. Dyslipidemia  4. Diabetes mellitus  5. CVA   5.1 CT Head 5/31/18 - mod remote microvascular ischemic changes in the periventricular white matter with no acute abnormality or change   6. Shortness of breath 10/13/14-EF 60-65%, mild LVH, mild diastolic dysfunction, revealed to mild TR, mild posterior pericardial effusion which may extend minimally to the lateral wall, PA 30-35  7. History of pancreatitis     Patient is a 63-year-old female who presents back to the office for follow-up.  Patient describes to me that she has been having episodic discomfort substernally.  She develops sharp discomfort.  This occurs at random.  She feels that it has progressed to a mild degree..  She has been short of breath.  She has had more shortness of breath of recent.  She has no PND orthopnea.    No palpitations or dysrhythmic symptoms.  Otherwise patient is doing well    Outpatient Encounter Medications as of 9/11/2019   Medication Sig Dispense Refill   • amLODIPine (NORVASC) 5 MG tablet Take 1 tablet by mouth 2 (Two) Times a Day. 180 tablet 3   • asenapine maleate (SAPHRIS) 10 MG sublingual tablet sublingual tablet Saphris (black cherry) 10 mg sublingual tablet     • aspirin 81 MG EC tablet Take 81 mg by mouth Daily.     • atorvastatin  (LIPITOR) 40 MG tablet Take 1 tablet by mouth Daily. 90 tablet 3   • busPIRone (BUSPAR) 10 MG tablet Take 10 mg by mouth 3 (Three) Times a Day.     • CloNIDine (CATAPRES) 0.1 MG tablet Take 1 tablet by mouth 3 (Three) Times a Day.     • CloNIDine (CATAPRES-TTS) 0.3 MG/24HR patch clonidine 0.3 mg/24 hr weekly transdermal patch     • dicyclomine (BENTYL) 10 MG capsule Take 10 mg by mouth 4 (Four) Times a Day Before Meals & at Bedtime.     • gabapentin (NEURONTIN) 800 MG tablet Take 800 mg by mouth 3 (Three) Times a Day.     • lamoTRIgine (LaMICtal) 100 MG tablet Take 100 mg by mouth 2 (Two) Times a Day.     • lisinopril (PRINIVIL,ZESTRIL) 40 MG tablet Take 40 mg by mouth 2 (Two) Times a Day.     • OLANZapine (zyPREXA) 10 MG tablet Take 10 mg by mouth Daily.     • omeprazole (priLOSEC) 20 MG capsule Take 20 mg by mouth Daily.     • ondansetron (ZOFRAN) 4 MG tablet Take 4 mg by mouth Every 8 (Eight) Hours As Needed for Nausea or Vomiting.     • oxyCODONE-acetaminophen (PERCOCET)  MG per tablet Take 1 tablet by mouth As Needed for Moderate Pain .     • pancrelipase, Lip-Prot-Amyl, (CREON) 88947-85925 units capsule delayed-release particles capsule Take 24,000 units of lipase by mouth 3 (Three) Times a Day With Meals.     • promethazine (PHENERGAN) 25 MG tablet Take 25 mg by mouth As Needed for Nausea or Vomiting.     • TiZANidine (ZANAFLEX) 4 MG capsule Take 4 mg by mouth 3 (Three) Times a Day.     • traMADol (ULTRAM) 50 MG tablet TAKE 1 TABLET BY MOUTH EVERY 4 TO 6 HOURS AS NEEDED FOR PAIN  1   • [DISCONTINUED] labetalol (TRANDATE) 100 MG tablet Take 400 mg by mouth 2 (Two) Times a Day.     • diphenhydrAMINE (BENADRYL ALLERGY) 25 MG tablet Take BID the day before the cath and q tab the day of the cath. 3 tablet 0   • famotidine (PEPCID) 20 MG tablet BID the day before the cath and the am of the cath 3 tablet 0   • furosemide (LASIX) 80 MG tablet Take 80 mg by mouth Daily.     • metoprolol succinate XL (TOPROL XL)  100 MG 24 hr tablet Take 1 tablet by mouth Daily. 90 tablet 3   • nitroglycerin (NITROSTAT) 0.4 MG SL tablet Place  under the tongue.     • [DISCONTINUED] isosorbide dinitrate (ISORDIL) 10 MG tablet Take 1 tablet by mouth 2 (Two) Times a Day. 180 tablet 3   • [DISCONTINUED] isosorbide mononitrate (IMDUR) 30 MG 24 hr tablet Take 1 tablet by mouth Every Morning. 90 tablet 3   • [DISCONTINUED] metoprolol succinate XL (TOPROL XL) 100 MG 24 hr tablet Take 1 tablet by mouth Daily. 90 tablet 3     No facility-administered encounter medications on file as of 9/11/2019.        Fentanyl; Midazolam; Contrast dye; Hydrocodone; Imdur [isosorbide nitrate]; Metoclopramide; Spironolactone; Sulfa antibiotics; Zolpidem; and Penicillins    Past Medical History:   Diagnosis Date   • Bowel obstruction (CMS/HCC)    • Disease of thyroid gland    • Hyperlipidemia    • Hypertension    • Kidney stone    • Pneumonia        Social History     Socioeconomic History   • Marital status:      Spouse name: Not on file   • Number of children: Not on file   • Years of education: Not on file   • Highest education level: Not on file   Tobacco Use   • Smoking status: Never Smoker   • Smokeless tobacco: Never Used   Substance and Sexual Activity   • Alcohol use: No   • Drug use: Defer   • Sexual activity: Defer       Family History   Problem Relation Age of Onset   • Heart disease Mother    • Cancer Mother    • Hypertension Mother    • Hyperlipidemia Mother    • Heart disease Father    • Cancer Father    • Hyperlipidemia Father    • Hypertension Father        Review of Systems   Constitutional: Positive for fatigue.   HENT: Negative.    Eyes: Positive for visual disturbance (wears glasses).   Respiratory: Positive for shortness of breath.    Cardiovascular: Positive for chest pain, palpitations and leg swelling.   Gastrointestinal: Positive for abdominal pain.   Endocrine: Negative.    Genitourinary: Negative.    Musculoskeletal: Positive for  "arthralgias and myalgias.   Skin: Negative.    Allergic/Immunologic: Negative.    Neurological: Negative.    Hematological: Bruises/bleeds easily.   Psychiatric/Behavioral: Positive for sleep disturbance.   All other systems reviewed and are negative.      Objective   Vitals:    09/11/19 1426   BP: 147/73   BP Location: Left arm   Patient Position: Sitting   Pulse: 74   SpO2: 93%   Weight: 88.7 kg (195 lb 9.6 oz)   Height: 160 cm (62.99\")      /73 (BP Location: Left arm, Patient Position: Sitting)   Pulse 74   Ht 160 cm (62.99\")   Wt 88.7 kg (195 lb 9.6 oz)   SpO2 93%   BMI 34.66 kg/m²     Lab Results (most recent)     None          Physical Exam   Constitutional: She is oriented to person, place, and time. She appears well-developed and well-nourished. No distress.   HENT:   Head: Normocephalic and atraumatic.   Eyes: EOM are normal. Pupils are equal, round, and reactive to light.   Neck: No JVD present.   Cardiovascular: Normal rate, regular rhythm, normal heart sounds and intact distal pulses. Exam reveals no gallop and no friction rub.   No murmur heard.  Pulmonary/Chest: Effort normal and breath sounds normal. No respiratory distress. She has no wheezes. She has no rales. She exhibits no tenderness.   Musculoskeletal: Normal range of motion. She exhibits no edema.   Neurological: She is alert and oriented to person, place, and time. No cranial nerve deficit.   Skin: Skin is warm and dry. No rash noted. No erythema. No pallor.   Psychiatric: She has a normal mood and affect. Her behavior is normal.   Nursing note and vitals reviewed.      Procedure     ECG 12 Lead  Date/Time: 9/11/2019 2:32 PM  Performed by: Cameron Hastings PA  Authorized by: Cameron Hastings PA   Comparison: compared with previous ECG from 7/29/2018  Similar to previous ECG  Comments: EKG demonstrates paced rhythm at 75 bpm with no acute ST changes               Assessment/Plan     Problems Addressed this Visit        " Cardiovascular and Mediastinum    Essential hypertension    Relevant Medications    metoprolol succinate XL (TOPROL XL) 100 MG 24 hr tablet    Other Relevant Orders    ECG 12 Lead    Adult Transthoracic Echo Complete W/ Cont if Necessary Per Protocol    Stress Test With Myocardial Perfusion One Day      Other Visit Diagnoses     Coronary artery disease due to calcified coronary lesion    -  Primary    Relevant Medications    metoprolol succinate XL (TOPROL XL) 100 MG 24 hr tablet    Other Relevant Orders    ECG 12 Lead    Adult Transthoracic Echo Complete W/ Cont if Necessary Per Protocol    Stress Test With Myocardial Perfusion One Day    Precordial pain        Relevant Orders    ECG 12 Lead    Adult Transthoracic Echo Complete W/ Cont if Necessary Per Protocol    Stress Test With Myocardial Perfusion One Day              Recommendation  1.  Patient with atypical symptoms at this time.  She does not feel that she can tolerate long-acting nitrates.  She had one episode of chest pain taking nitroglycerin and it resolved.  She described isosorbide (Imdur) cause a significant drop in blood pressure.  I would like to place her on isosorbide short acting to see if this will help in regards to her discomfort.  2.  I am scheduling testing to evaluate and ensure no cardiac cause of her symptoms.  Echocardiogram will be performed as well as stress test.  3.  I want to see her back for follow-up after testing.  She will call back in 1 week with symptom check.  She will follow with primary as scheduled                Patient's Body mass index is 34.66 kg/m². BMI is above normal parameters. Recommendations include: educational material and referral to primary care.       Electronically signed by:

## 2019-09-11 NOTE — TELEPHONE ENCOUNTER
Patient called stating Cameron prescribed Isosorbide (Isordil) while in office today. When she got home she realized she had taken Imdur before and it caused her to pass out. Verbal order per Simba Fuller PA-C for Ranexa 500 mg BID. Patient is not currently taking Zocor or Metformin. She request Ranexa be set to [facility]. Niharika Su MA

## 2019-10-01 ENCOUNTER — HOSPITAL ENCOUNTER (OUTPATIENT)
Dept: CARDIOLOGY | Facility: HOSPITAL | Age: 64
Discharge: HOME | End: 2019-10-01

## 2019-10-01 VITALS — WEIGHT: 195.55 LBS | BODY MASS INDEX: 34.65 KG/M2 | HEIGHT: 63 IN

## 2019-10-01 DIAGNOSIS — I25.84 CORONARY ARTERY DISEASE DUE TO CALCIFIED CORONARY LESION: ICD-10-CM

## 2019-10-01 DIAGNOSIS — I10 ESSENTIAL HYPERTENSION: ICD-10-CM

## 2019-10-01 DIAGNOSIS — I25.10 CORONARY ARTERY DISEASE DUE TO CALCIFIED CORONARY LESION: ICD-10-CM

## 2019-10-01 DIAGNOSIS — R07.2 PRECORDIAL PAIN: ICD-10-CM

## 2019-10-01 PROCEDURE — 78452 HT MUSCLE IMAGE SPECT MULT: CPT | Performed by: INTERNAL MEDICINE

## 2019-10-01 PROCEDURE — 0 TECHNETIUM SESTAMIBI: Performed by: INTERNAL MEDICINE

## 2019-10-01 PROCEDURE — A9500 TC99M SESTAMIBI: HCPCS | Performed by: INTERNAL MEDICINE

## 2019-10-01 PROCEDURE — 93306 TTE W/DOPPLER COMPLETE: CPT

## 2019-10-01 PROCEDURE — 25010000002 REGADENOSON 0.4 MG/5ML SOLUTION: Performed by: INTERNAL MEDICINE

## 2019-10-01 PROCEDURE — 78452 HT MUSCLE IMAGE SPECT MULT: CPT

## 2019-10-01 PROCEDURE — 93306 TTE W/DOPPLER COMPLETE: CPT | Performed by: INTERNAL MEDICINE

## 2019-10-01 PROCEDURE — 93018 CV STRESS TEST I&R ONLY: CPT | Performed by: INTERNAL MEDICINE

## 2019-10-01 PROCEDURE — 93017 CV STRESS TEST TRACING ONLY: CPT

## 2019-10-01 RX ADMIN — TECHNETIUM TC 99M SESTAMIBI 1 DOSE: 1 INJECTION INTRAVENOUS at 12:27

## 2019-10-01 RX ADMIN — REGADENOSON 0.4 MG: 0.08 INJECTION, SOLUTION INTRAVENOUS at 14:00

## 2019-10-01 RX ADMIN — TECHNETIUM TC 99M SESTAMIBI 1 DOSE: 1 INJECTION INTRAVENOUS at 14:00

## 2019-10-02 LAB
BH CV NUCLEAR PRIOR STUDY: 3
BH CV STRESS BP STAGE 1: NORMAL
BH CV STRESS COMMENTS STAGE 1: NORMAL
BH CV STRESS DOSE REGADENOSON STAGE 1: 0.4
BH CV STRESS DURATION MIN STAGE 1: 0
BH CV STRESS DURATION SEC STAGE 1: 10
BH CV STRESS HR STAGE 1: 74
BH CV STRESS PROTOCOL 1: NORMAL
BH CV STRESS RECOVERY BP: NORMAL MMHG
BH CV STRESS RECOVERY HR: 74 BPM
BH CV STRESS STAGE 1: 1
MAXIMAL PREDICTED HEART RATE: 155 BPM
PERCENT MAX PREDICTED HR: 52.26 %
STRESS BASELINE BP: NORMAL MMHG
STRESS BASELINE HR: 69 BPM
STRESS PERCENT HR: 61 %
STRESS POST PEAK BP: NORMAL MMHG
STRESS POST PEAK HR: 81 BPM
STRESS TARGET HR: 132 BPM

## 2019-10-03 ENCOUNTER — TELEPHONE (OUTPATIENT)
Dept: CARDIOLOGY | Facility: CLINIC | Age: 64
End: 2019-10-03

## 2019-10-03 LAB
BH CV ECHO MEAS - ACS: 1.8 CM
BH CV ECHO MEAS - AO MAX PG (FULL): 0.14 MMHG
BH CV ECHO MEAS - AO MAX PG: 8 MMHG
BH CV ECHO MEAS - AO MEAN PG (FULL): 1 MMHG
BH CV ECHO MEAS - AO MEAN PG: 4.3 MMHG
BH CV ECHO MEAS - AO ROOT AREA (BSA CORRECTED): 1.6
BH CV ECHO MEAS - AO ROOT AREA: 7.1 CM^2
BH CV ECHO MEAS - AO ROOT DIAM: 3 CM
BH CV ECHO MEAS - AO V2 MAX: 141.4 CM/SEC
BH CV ECHO MEAS - AO V2 MEAN: 95.7 CM/SEC
BH CV ECHO MEAS - AO V2 VTI: 38.1 CM
BH CV ECHO MEAS - BSA(HAYCOCK): 2 M^2
BH CV ECHO MEAS - BSA: 1.9 M^2
BH CV ECHO MEAS - BZI_BMI: 34.5 KILOGRAMS/M^2
BH CV ECHO MEAS - BZI_METRIC_HEIGHT: 160 CM
BH CV ECHO MEAS - BZI_METRIC_WEIGHT: 88.5 KG
BH CV ECHO MEAS - CI(CUBED): 7.2 L/MIN/M^2
BH CV ECHO MEAS - CI(TEICH): 6.1 L/MIN/M^2
BH CV ECHO MEAS - CO(CUBED): 13.8 L/MIN
BH CV ECHO MEAS - CO(TEICH): 11.7 L/MIN
BH CV ECHO MEAS - EDV(CUBED): 121.3 ML
BH CV ECHO MEAS - EDV(TEICH): 115.5 ML
BH CV ECHO MEAS - EF(CUBED): 73.7 %
BH CV ECHO MEAS - EF(TEICH): 65.3 %
BH CV ECHO MEAS - ESV(CUBED): 31.9 ML
BH CV ECHO MEAS - ESV(TEICH): 40 ML
BH CV ECHO MEAS - FS: 36 %
BH CV ECHO MEAS - IVS/LVPW: 1
BH CV ECHO MEAS - IVSD: 1.1 CM
BH CV ECHO MEAS - LA DIMENSION(2D): 3.8 CM
BH CV ECHO MEAS - LA DIMENSION: 3.7 CM
BH CV ECHO MEAS - LA/AO: 1.2
BH CV ECHO MEAS - LAT PEAK E' VEL: 7 CM/SEC
BH CV ECHO MEAS - LV IVRT: 0.06 SEC
BH CV ECHO MEAS - LV MASS(C)D: 198.8 GRAMS
BH CV ECHO MEAS - LV MASS(C)DI: 103.9 GRAMS/M^2
BH CV ECHO MEAS - LV MAX PG: 7.9 MMHG
BH CV ECHO MEAS - LV MEAN PG: 3.3 MMHG
BH CV ECHO MEAS - LV V1 MAX: 140.1 CM/SEC
BH CV ECHO MEAS - LV V1 MEAN: 81.9 CM/SEC
BH CV ECHO MEAS - LV V1 VTI: 31.7 CM
BH CV ECHO MEAS - LVIDD: 5 CM
BH CV ECHO MEAS - LVIDS: 3.2 CM
BH CV ECHO MEAS - LVPWD: 1.1 CM
BH CV ECHO MEAS - MED PEAK E' VEL: 7 CM/SEC
BH CV ECHO MEAS - MITRAL HR: 118 BPM
BH CV ECHO MEAS - MITRAL R-R: 0.51 SEC
BH CV ECHO MEAS - MM HR: 154.8 BPM
BH CV ECHO MEAS - MM R-R INT: 0.39 SEC
BH CV ECHO MEAS - MV A MAX VEL: 79 CM/SEC
BH CV ECHO MEAS - MV DEC SLOPE: 447.1 CM/SEC^2
BH CV ECHO MEAS - MV DEC TIME: 0.24 SEC
BH CV ECHO MEAS - MV E MAX VEL: 109 CM/SEC
BH CV ECHO MEAS - MV E/A: 1.4
BH CV ECHO MEAS - MV MAX PG: 5.8 MMHG
BH CV ECHO MEAS - MV MEAN PG: 2.6 MMHG
BH CV ECHO MEAS - MV V2 MAX: 120.2 CM/SEC
BH CV ECHO MEAS - MV V2 MEAN: 76.3 CM/SEC
BH CV ECHO MEAS - MV V2 VTI: 38.8 CM
BH CV ECHO MEAS - PA MAX PG (FULL): 1.8 MMHG
BH CV ECHO MEAS - PA MAX PG: 5.3 MMHG
BH CV ECHO MEAS - PA MEAN PG (FULL): 1.1 MMHG
BH CV ECHO MEAS - PA MEAN PG: 2.7 MMHG
BH CV ECHO MEAS - PA V2 MAX: 114.8 CM/SEC
BH CV ECHO MEAS - PA V2 MEAN: 77.5 CM/SEC
BH CV ECHO MEAS - PA V2 VTI: 28.4 CM
BH CV ECHO MEAS - PULM. HR: 160.6 BPM
BH CV ECHO MEAS - PULM. R-R: 0.37 SEC
BH CV ECHO MEAS - RAP SYSTOLE: 10 MMHG
BH CV ECHO MEAS - RV MAX PG: 3.5 MMHG
BH CV ECHO MEAS - RV MEAN PG: 1.7 MMHG
BH CV ECHO MEAS - RV V1 MAX: 93.6 CM/SEC
BH CV ECHO MEAS - RV V1 MEAN: 58.8 CM/SEC
BH CV ECHO MEAS - RV V1 VTI: 23.2 CM
BH CV ECHO MEAS - RVDD: 3 CM
BH CV ECHO MEAS - RVSP: 34 MMHG
BH CV ECHO MEAS - SI(AO): 140.8 ML/M^2
BH CV ECHO MEAS - SI(CUBED): 46.8 ML/M^2
BH CV ECHO MEAS - SI(TEICH): 39.5 ML/M^2
BH CV ECHO MEAS - SV(AO): 269.3 ML
BH CV ECHO MEAS - SV(CUBED): 89.4 ML
BH CV ECHO MEAS - SV(TEICH): 75.5 ML
BH CV ECHO MEAS - TR MAX VEL: 228.7 CM/SEC
BH CV ECHO MEASUREMENTS AVERAGE E/E' RATIO: 15.57
MAXIMAL PREDICTED HEART RATE: 155 BPM
STRESS TARGET HR: 132 BPM

## 2019-10-03 NOTE — TELEPHONE ENCOUNTER
Attempted to call patient with stress test results. Niharika Su MA      Follow up scheduled 11/12/19.   ----- Message from Amna Mckenzie sent at 10/3/2019 12:36 PM EDT -----      ----- Message -----  From: Hong Manzano MD  Sent: 10/2/2019   8:09 PM  To: NICHOLE Ladd    Result Text     1.  Dense chest wall attenuation with no scintigraphic evidence of ischemia.     2.  Preserved post stress ejection fraction of 62% with no focal wall motion abnormalities.     3.  No evidence of pharmacologically-induced ischemic dilation or of increased lung uptake of radiopharmaceutical.

## 2019-10-04 NOTE — TELEPHONE ENCOUNTER
Patient called back and was notified of stress test results. She was also notified that due to Cameron being on vacation the appt on 11/12/19 was changed to 12/4/19 at 3:15pm. Pt is okay with this and will keep that follow up.  , GIO

## 2019-12-04 ENCOUNTER — OFFICE VISIT (OUTPATIENT)
Dept: CARDIOLOGY | Facility: CLINIC | Age: 64
End: 2019-12-04

## 2019-12-04 VITALS
WEIGHT: 200 LBS | DIASTOLIC BLOOD PRESSURE: 90 MMHG | SYSTOLIC BLOOD PRESSURE: 200 MMHG | RESPIRATION RATE: 16 BRPM | HEIGHT: 63 IN | BODY MASS INDEX: 35.44 KG/M2 | HEART RATE: 72 BPM | OXYGEN SATURATION: 89 %

## 2019-12-04 DIAGNOSIS — I25.10 CORONARY ARTERY DISEASE INVOLVING NATIVE CORONARY ARTERY OF NATIVE HEART WITHOUT ANGINA PECTORIS: ICD-10-CM

## 2019-12-04 DIAGNOSIS — I25.10 CORONARY ARTERY DISEASE INVOLVING NATIVE CORONARY ARTERY OF NATIVE HEART WITHOUT ANGINA PECTORIS: Primary | ICD-10-CM

## 2019-12-04 DIAGNOSIS — R09.89 LABILE HYPERTENSION: Primary | ICD-10-CM

## 2019-12-04 DIAGNOSIS — R94.39 ABNORMAL STRESS TEST: ICD-10-CM

## 2019-12-04 DIAGNOSIS — R06.02 SHORTNESS OF BREATH: ICD-10-CM

## 2019-12-04 PROCEDURE — 99214 OFFICE O/P EST MOD 30 MIN: CPT | Performed by: PHYSICIAN ASSISTANT

## 2019-12-04 RX ORDER — HYDRALAZINE HYDROCHLORIDE 50 MG/1
50 TABLET, FILM COATED ORAL 2 TIMES DAILY
Qty: 60 TABLET | Refills: 6 | Status: SHIPPED | OUTPATIENT
Start: 2019-12-04 | End: 2020-02-04 | Stop reason: SDUPTHER

## 2019-12-04 NOTE — PATIENT INSTRUCTIONS
"Fat and Cholesterol Restricted Eating Plan  Getting too much fat and cholesterol in your diet may cause health problems. Choosing the right foods helps keep your fat and cholesterol at normal levels. This can keep you from getting certain diseases.  Your doctor may recommend an eating plan that includes:  · Total fat: ______% or less of total calories a day.  · Saturated fat: ______% or less of total calories a day.  · Cholesterol: less than _________mg a day.  · Fiber: ______g a day.  What are tips for following this plan?  Meal planning  · At meals, divide your plate into four equal parts:  ? Fill one-half of your plate with vegetables and green salads.  ? Fill one-fourth of your plate with whole grains.  ? Fill one-fourth of your plate with low-fat (lean) protein foods.  · Eat fish that is high in omega-3 fats at least two times a week. This includes mackerel, tuna, sardines, and salmon.  · Eat foods that are high in fiber, such as whole grains, beans, apples, broccoli, carrots, peas, and barley.  General tips    · Work with your doctor to lose weight if you need to.  · Avoid:  ? Foods with added sugar.  ? Fried foods.  ? Foods with partially hydrogenated oils.  · Limit alcohol intake to no more than 1 drink a day for nonpregnant women and 2 drinks a day for men. One drink equals 12 oz of beer, 5 oz of wine, or 1½ oz of hard liquor.  Reading food labels  · Check food labels for:  ? Trans fats.  ? Partially hydrogenated oils.  ? Saturated fat (g) in each serving.  ? Cholesterol (mg) in each serving.  ? Fiber (g) in each serving.  · Choose foods with healthy fats, such as:  ? Monounsaturated fats.  ? Polyunsaturated fats.  ? Omega-3 fats.  · Choose grain products that have whole grains. Look for the word \"whole\" as the first word in the ingredient list.  Cooking  · Cook foods using low-fat methods. These include baking, boiling, grilling, and broiling.  · Eat more home-cooked foods. Eat at restaurants and buffets " less often.  · Avoid cooking using saturated fats, such as butter, cream, palm oil, palm kernel oil, and coconut oil.  Recommended foods    Fruits  · All fresh, canned (in natural juice), or frozen fruits.  Vegetables  · Fresh or frozen vegetables (raw, steamed, roasted, or grilled). Green salads.  Grains  · Whole grains, such as whole wheat or whole grain breads, crackers, cereals, and pasta. Unsweetened oatmeal, bulgur, barley, quinoa, or brown rice. Corn or whole wheat flour tortillas.  Meats and other protein foods  · Ground beef (85% or leaner), grass-fed beef, or beef trimmed of fat. Skinless chicken or turkey. Ground chicken or turkey. Pork trimmed of fat. All fish and seafood. Egg whites. Dried beans, peas, or lentils. Unsalted nuts or seeds. Unsalted canned beans. Nut butters without added sugar or oil.  Dairy  · Low-fat or nonfat dairy products, such as skim or 1% milk, 2% or reduced-fat cheeses, low-fat and fat-free ricotta or cottage cheese, or plain low-fat and nonfat yogurt.  Fats and oils  · Tub margarine without trans fats. Light or reduced-fat mayonnaise and salad dressings. Avocado. Olive, canola, sesame, or safflower oils.  The items listed above may not be a complete list of foods and beverages you can eat. Contact a dietitian for more information.  Foods to avoid  Fruits  · Canned fruit in heavy syrup. Fruit in cream or butter sauce. Fried fruit.  Vegetables  · Vegetables cooked in cheese, cream, or butter sauce. Fried vegetables.  Grains  · White bread. White pasta. White rice. Cornbread. Bagels, pastries, and croissants. Crackers and snack foods that contain trans fat and hydrogenated oils.  Meats and other protein foods  · Fatty cuts of meat. Ribs, chicken wings, castro, sausage, bologna, salami, chitterlings, fatback, hot dogs, bratwurst, and packaged lunch meats. Liver and organ meats. Whole eggs and egg yolks. Chicken and turkey with skin. Fried meat.  Dairy  · Whole or 2% milk, cream,  half-and-half, and cream cheese. Whole milk cheeses. Whole-fat or sweetened yogurt. Full-fat cheeses. Nondairy creamers and whipped toppings. Processed cheese, cheese spreads, and cheese curds.  Beverages  · Alcohol. Sugar-sweetened drinks such as sodas, lemonade, and fruit drinks.  Fats and oils  · Butter, stick margarine, lard, shortening, ghee, or casrto fat. Coconut, palm kernel, and palm oils.  Sweets and desserts  · Corn syrup, sugars, honey, and molasses. Candy. Jam and jelly. Syrup. Sweetened cereals. Cookies, pies, cakes, donuts, muffins, and ice cream.  The items listed above may not be a complete list of foods and beverages you should avoid. Contact a dietitian for more information.  Summary  · Choosing the right foods helps keep your fat and cholesterol at normal levels. This can keep you from getting certain diseases.  · At meals, fill one-half of your plate with vegetables and green salads.  · Eat high-fiber foods, like whole grains, beans, apples, carrots, peas, and barley.  · Limit added sugar, saturated fats, alcohol, and fried foods.  This information is not intended to replace advice given to you by your health care provider. Make sure you discuss any questions you have with your health care provider.  Document Released: 06/18/2013 Document Revised: 08/21/2019 Document Reviewed: 09/04/2018  [employer] Patient Education © 2019 [employer]  BMI for Adults    Body mass index (BMI) is a number that is calculated from a person's weight and height. BMI may help to estimate how much of a person's weight is composed of fat. BMI can help identify those who may be at higher risk for certain medical problems.  How is BMI used with adults?  BMI is used as a screening tool to identify possible weight problems. It is used to check whether a person is obese, overweight, healthy weight, or underweight.  How is BMI calculated?  BMI measures your weight and compares it to your height. This can be done  "either in English ([place] or metric measurements. Note that charts are available to help you find your BMI quickly and easily without having to do these calculations yourself.  To calculate your BMI in English ([place] measurements, your health care provider will:  1. Measure your weight in pounds (lb).  2. Multiply the number of pounds by 703.  ? For example, for a person who weighs 180 lb, multiply that number by 703, which equals 126,540.  3. Measure your height in inches (in). Then multiply that number by itself to get a measurement called \"inches squared.\"  ? For example, for a person who is 70 in tall, the \"inches squared\" measurement is 70 in x 70 in, which equals 4900 inches squared.  4. Divide the total from Step 2 (number of lb x 703) by the total from Step 3 (inches squared): 126,540 ÷ 4900 = 25.8. This is your BMI.  To calculate your BMI in metric measurements, your health care provider will:  1. Measure your weight in kilograms (kg).  2. Measure your height in meters (m). Then multiply that number by itself to get a measurement called \"meters squared.\"  ? For example, for a person who is 1.75 m tall, the \"meters squared\" measurement is 1.75 m x 1.75 m, which is equal to 3.1 meters squared.  3. Divide the number of kilograms (your weight) by the meters squared number. In this example: 70 ÷ 3.1 = 22.6. This is your BMI.  How is BMI interpreted?  To interpret your results, your health care provider will use BMI charts to identify whether you are underweight, normal weight, overweight, or obese. The following guidelines will be used:  · Underweight: BMI less than 18.5.  · Normal weight: BMI between 18.5 and 24.9.  · Overweight: BMI between 25 and 29.9.  · Obese: BMI of 30 and above.  Please note:  · Weight includes both fat and muscle, so someone with a muscular build, such as an athlete, may have a BMI that is higher than 24.9. In cases like these, BMI is not an accurate measure of body fat.  · To determine " if excess body fat is the cause of a BMI of 25 or higher, further assessments may need to be done by a health care provider.  · BMI is usually interpreted in the same way for men and women.  Why is BMI a useful tool?  BMI is useful in two ways:  · Identifying a weight problem that may be related to a medical condition, or that may increase the risk for medical problems.  · Promoting lifestyle and diet changes in order to reach a healthy weight.  Summary  · Body mass index (BMI) is a number that is calculated from a person's weight and height.  · BMI may help to estimate how much of a person's weight is composed of fat. BMI can help identify those who may be at higher risk for certain medical problems.  · BMI can be measured using English measurements or metric measurements.  · To interpret your results, your health care provider will use BMI charts to identify whether you are underweight, normal weight, overweight, or obese.  This information is not intended to replace advice given to you by your health care provider. Make sure you discuss any questions you have with your health care provider.  Document Released: 08/29/2005 Document Revised: 10/31/2018 Document Reviewed: 10/31/2018  [employer] Interactive Patient Education © 2019 [email or web address]

## 2019-12-04 NOTE — PROGRESS NOTES
Problem list     Subjective   Keara Ortiz is a 64 y.o. female     Chief Complaint   Patient presents with   • Coronary Artery Disease     follow up test results   • Hypertension      Problem List:     1. Minor nonobstructive CAD per cath in 2006.  1.1 Left heart catheter 7/27/06-20% proximal narrowing to the circumflex, EF 65-70%, left ventricular end-diastolic pressure 25  1.2 Stress test 10/13/14-low risk, no evidence of ischemia  1.3 stress test July 2018 demonstrated inferior ischemia and preserved LV function  1.4 cardiac catheterization March 2019 demonstrated 30-50% LAD disease but nonobstructive disease otherwise and preserved LV function  1.5 stress test October 2019 with no evidence of ischemia and dense diaphragmatic attenuation with preserved LV function  2. Hypertension  3. Dyslipidemia  4. Diabetes mellitus  5. CVA   5.1 CT Head 5/31/18 - mod remote microvascular ischemic changes in the periventricular white matter with no acute abnormality or change   6. Shortness of breath 10/13/14-EF 60-65%, mild LVH, mild diastolic dysfunction, revealed to mild TR, mild posterior pericardial effusion which may extend minimally to the lateral wall, PA 30-35  7. History of pancreatitis    HPI    Patient is a 64-year-old female who presents back to the office for follow-up.  Patient presents back today with difficulty in regards to blood pressure control.  Patient is on multiple medications to control her blood pressure and unfortunately it has been unsuccessful.  She currently takes 10 mg of amlodipine, 100 mg of Toprol, 0.3 Catapres patch, 40 mg of lisinopril and uses additional clonidine for blood pressure spikes.  Her blood pressure today is currently 200/90.  Patient describes recently following because she tripped over something at home and suffered a friend she had her arm.  She is currently wearing a cast and patient describes that with her pain, she has had to go to control her blood pressure.    She  does not have chest pain but is profoundly dyspneic.  Patient describes walking across the room she is significantly short of breath.  She has no PND orthopnea.    She does not palpitated of dysrhythmic symptoms.  She is concerned about the level of her dyspnea.  She is also concerned about her blood pressure being controlled.  She is doing well otherwise      Current Outpatient Medications on File Prior to Visit   Medication Sig Dispense Refill   • amLODIPine (NORVASC) 5 MG tablet Take 1 tablet by mouth 2 (Two) Times a Day. 180 tablet 3   • asenapine maleate (SAPHRIS) 10 MG sublingual tablet sublingual tablet Saphris (black cherry) 10 mg sublingual tablet     • aspirin 81 MG EC tablet Take 81 mg by mouth Daily.     • atorvastatin (LIPITOR) 40 MG tablet Take 1 tablet by mouth Daily. 90 tablet 3   • busPIRone (BUSPAR) 10 MG tablet Take 10 mg by mouth 3 (Three) Times a Day.     • CloNIDine (CATAPRES) 0.1 MG tablet Take 1 tablet by mouth 3 (Three) Times a Day.     • CloNIDine (CATAPRES-TTS) 0.3 MG/24HR patch clonidine 0.3 mg/24 hr weekly transdermal patch     • dicyclomine (BENTYL) 10 MG capsule Take 10 mg by mouth 4 (Four) Times a Day Before Meals & at Bedtime.     • diphenhydrAMINE (BENADRYL ALLERGY) 25 MG tablet Take BID the day before the cath and q tab the day of the cath. 3 tablet 0   • famotidine (PEPCID) 20 MG tablet BID the day before the cath and the am of the cath 3 tablet 0   • furosemide (LASIX) 80 MG tablet Take 80 mg by mouth Daily.     • gabapentin (NEURONTIN) 800 MG tablet Take 800 mg by mouth 3 (Three) Times a Day.     • lamoTRIgine (LaMICtal) 100 MG tablet Take 100 mg by mouth 2 (Two) Times a Day.     • lisinopril (PRINIVIL,ZESTRIL) 40 MG tablet Take 40 mg by mouth 2 (Two) Times a Day.     • metoprolol succinate XL (TOPROL XL) 100 MG 24 hr tablet Take 1 tablet by mouth Daily. 90 tablet 3   • nitroglycerin (NITROSTAT) 0.4 MG SL tablet Place  under the tongue.     • OLANZapine (zyPREXA) 10 MG tablet  Take 10 mg by mouth Daily.     • omeprazole (priLOSEC) 20 MG capsule Take 20 mg by mouth Daily.     • ondansetron (ZOFRAN) 4 MG tablet Take 4 mg by mouth Every 8 (Eight) Hours As Needed for Nausea or Vomiting.     • oxyCODONE-acetaminophen (PERCOCET)  MG per tablet Take 1 tablet by mouth As Needed for Moderate Pain .     • pancrelipase, Lip-Prot-Amyl, (CREON) 50595-68177 units capsule delayed-release particles capsule Take 24,000 units of lipase by mouth 3 (Three) Times a Day With Meals.     • promethazine (PHENERGAN) 25 MG tablet Take 25 mg by mouth As Needed for Nausea or Vomiting.     • ranolazine (RANEXA) 500 MG 12 hr tablet Take 1 tablet by mouth 2 (Two) Times a Day. 180 tablet 3   • TiZANidine (ZANAFLEX) 4 MG capsule Take 4 mg by mouth 3 (Three) Times a Day.     • traMADol (ULTRAM) 50 MG tablet TAKE 1 TABLET BY MOUTH EVERY 4 TO 6 HOURS AS NEEDED FOR PAIN  1     No current facility-administered medications on file prior to visit.        Fentanyl; Midazolam; Contrast dye; Hydrocodone; Imdur [isosorbide nitrate]; Metoclopramide; Spironolactone; Sulfa antibiotics; Zolpidem; and Penicillins    Past Medical History:   Diagnosis Date   • Arm fracture    • Bowel obstruction (CMS/HCC)    • Disease of thyroid gland    • Hyperlipidemia    • Hypertension    • Kidney stone    • Liver cirrhosis (CMS/HCC)    • Pneumonia        Social History     Socioeconomic History   • Marital status:      Spouse name: Not on file   • Number of children: Not on file   • Years of education: Not on file   • Highest education level: Not on file   Tobacco Use   • Smoking status: Never Smoker   • Smokeless tobacco: Never Used   Substance and Sexual Activity   • Alcohol use: No   • Drug use: Defer   • Sexual activity: Defer       Family History   Problem Relation Age of Onset   • Heart disease Mother    • Cancer Mother    • Hypertension Mother    • Hyperlipidemia Mother    • Heart disease Father    • Cancer Father    •  "Hyperlipidemia Father    • Hypertension Father        Review of Systems   Constitutional: Positive for fatigue (tires easily). Negative for activity change and appetite change.   HENT: Negative for facial swelling and trouble swallowing.    Eyes: Positive for visual disturbance (eye glasses).   Respiratory: Positive for apnea (Cpap at bedtime) and shortness of breath (with exertion). Negative for cough and chest tightness.    Cardiovascular: Positive for leg swelling (bilaterally). Negative for chest pain and palpitations.   Gastrointestinal: Negative for abdominal distention, abdominal pain, constipation and nausea.   Endocrine: Negative for cold intolerance and heat intolerance.   Genitourinary: Negative.    Musculoskeletal: Positive for arthralgias, back pain, joint swelling and myalgias. Negative for gait problem.   Skin: Negative for color change and rash.   Allergic/Immunologic: Negative for environmental allergies and food allergies.   Neurological: Negative for dizziness, syncope and light-headedness.   Hematological: Does not bruise/bleed easily.   Psychiatric/Behavioral: Negative for agitation, sleep disturbance and suicidal ideas. The patient is nervous/anxious.    All other systems reviewed and are negative.      Objective   Vitals:    12/04/19 1523   BP: (!) 200/90   BP Location: Right arm   Patient Position: Sitting   Pulse: 72   Resp: 16   SpO2: (!) 89%   Weight: 90.7 kg (200 lb)   Height: 160 cm (62.99\")      BP (!) 200/90 (BP Location: Right arm, Patient Position: Sitting)   Pulse 72   Resp 16   Ht 160 cm (62.99\")   Wt 90.7 kg (200 lb)   SpO2 (!) 89%   BMI 35.44 kg/m²     Lab Results (most recent)     None          Physical Exam   Constitutional: She is oriented to person, place, and time. She appears well-developed and well-nourished. No distress.   HENT:   Head: Normocephalic and atraumatic.   Eyes: EOM are normal. Pupils are equal, round, and reactive to light.   Neck: No JVD present. "   Cardiovascular: Normal rate, regular rhythm, normal heart sounds and intact distal pulses. Exam reveals no gallop and no friction rub.   No murmur heard.  Pulmonary/Chest: Effort normal and breath sounds normal. No respiratory distress. She has no wheezes. She has no rales. She exhibits no tenderness.   Musculoskeletal: Normal range of motion. She exhibits no edema.   Neurological: She is alert and oriented to person, place, and time. No cranial nerve deficit.   Skin: Skin is warm and dry. No rash noted. No erythema. No pallor.   Psychiatric: She has a normal mood and affect. Her behavior is normal.   Nursing note and vitals reviewed.      Procedure   Procedures       Assessment/Plan     Problems Addressed this Visit        Cardiovascular and Mediastinum    Coronary artery disease involving native coronary artery of native heart without angina pectoris    Relevant Orders    CT angiogram abdomen pelvis w wo contrast    CT Angiogram Chest    Labile hypertension - Primary    Relevant Orders    CT angiogram abdomen pelvis w wo contrast    CT Angiogram Chest       Respiratory    Shortness of breath    Relevant Orders    CT angiogram abdomen pelvis w wo contrast    CT Angiogram Chest          Recommendation  1.  Patient with coronary artery disease with no symptoms of angina, failure or arrhythmia.  Recent stress test shows no evidence of ischemia and patient has preserved LV function.  2.  Patient with labile hypertension.  She is being treated for sleep apnea but she has significant hypertension and presents today with urgency.  She has taken a clonidine.  I do feel a degree of secondary work-up should be performed.  I would like to perform CTA of the renals to see if there is any obstructive disease that could be treated and improve her blood pressure.  I am adding hydralazine to her regimen to help control blood pressure.   3. Because of her profound dyspnea, would like to perform CTA of the chest to exclude  pulmonary embolus.  We can also evaluate for any potential lung disease making her so short of breath.  4.  She will take clonidine as needed.  We will premedicate her for her CT scans and I would like to see her back for follow-up.  She will follow with primary as scheduled         Patient's Body mass index is 35.44 kg/m². BMI is above normal parameters. Recommendations include: educational material.       Electronically signed by:

## 2019-12-05 ENCOUNTER — TELEPHONE (OUTPATIENT)
Dept: CARDIOLOGY | Facility: CLINIC | Age: 64
End: 2019-12-05

## 2019-12-05 DIAGNOSIS — T50.8X5D ALLERGIC REACTION TO CONTRAST MATERIAL, SUBSEQUENT ENCOUNTER: Primary | ICD-10-CM

## 2019-12-05 RX ORDER — PREDNISONE 50 MG/1
TABLET ORAL
Qty: 3 TABLET | Refills: 0 | OUTPATIENT
Start: 2019-12-05 | End: 2020-03-18

## 2019-12-05 NOTE — TELEPHONE ENCOUNTER
CTA ABD./PELVIS SCHEUDLED FOR 12-17-19 AT 8:30 AM AT [facility] CALLED TO PATIENT PER MURTAZA MORROW. PATIENT ALLERGIC TO CONTRAST DYE. V/O PER MARLA DE LA [facility]A, PAC TO SEND IN [facility] DYE PROTOCOL OF PREDNISONE 50 MG PO 13,7,1 HOUR PRIOR TO STUDY AND TAKE 50 MG OF BENADRYL TO CTA APPT. PATIENT STATES HAS BENADRYL ALREADY AT HOME, AND AWARE PREDNISONE CALLED IN TO JOSE [facility]ITH. PH,LPN

## 2020-02-04 ENCOUNTER — OFFICE VISIT (OUTPATIENT)
Dept: CARDIOLOGY | Facility: CLINIC | Age: 65
End: 2020-02-04

## 2020-02-04 VITALS
DIASTOLIC BLOOD PRESSURE: 80 MMHG | WEIGHT: 191 LBS | HEART RATE: 69 BPM | RESPIRATION RATE: 16 BRPM | BODY MASS INDEX: 33.84 KG/M2 | SYSTOLIC BLOOD PRESSURE: 162 MMHG | HEIGHT: 63 IN | OXYGEN SATURATION: 96 %

## 2020-02-04 DIAGNOSIS — I25.10 CORONARY ARTERY DISEASE INVOLVING NATIVE CORONARY ARTERY OF NATIVE HEART WITHOUT ANGINA PECTORIS: ICD-10-CM

## 2020-02-04 DIAGNOSIS — R09.89 LABILE HYPERTENSION: Primary | ICD-10-CM

## 2020-02-04 DIAGNOSIS — R06.02 SHORTNESS OF BREATH: ICD-10-CM

## 2020-02-04 PROCEDURE — 99214 OFFICE O/P EST MOD 30 MIN: CPT | Performed by: PHYSICIAN ASSISTANT

## 2020-02-04 RX ORDER — OXYCODONE HYDROCHLORIDE 5 MG/1
5 CAPSULE ORAL EVERY 8 HOURS PRN
COMMUNITY

## 2020-02-04 RX ORDER — HYDRALAZINE HYDROCHLORIDE 50 MG/1
50 TABLET, FILM COATED ORAL 3 TIMES DAILY
Qty: 60 TABLET | Refills: 6 | Status: SHIPPED | OUTPATIENT
Start: 2020-02-04 | End: 2020-04-07 | Stop reason: SDUPTHER

## 2020-02-04 NOTE — PATIENT INSTRUCTIONS
"Fat and Cholesterol Restricted Eating Plan  Getting too much fat and cholesterol in your diet may cause health problems. Choosing the right foods helps keep your fat and cholesterol at normal levels. This can keep you from getting certain diseases.  Your doctor may recommend an eating plan that includes:  · Total fat: ______% or less of total calories a day.  · Saturated fat: ______% or less of total calories a day.  · Cholesterol: less than _________mg a day.  · Fiber: ______g a day.  What are tips for following this plan?  Meal planning  · At meals, divide your plate into four equal parts:  ? Fill one-half of your plate with vegetables and green salads.  ? Fill one-fourth of your plate with whole grains.  ? Fill one-fourth of your plate with low-fat (lean) protein foods.  · Eat fish that is high in omega-3 fats at least two times a week. This includes mackerel, tuna, sardines, and salmon.  · Eat foods that are high in fiber, such as whole grains, beans, apples, broccoli, carrots, peas, and barley.  General tips    · Work with your doctor to lose weight if you need to.  · Avoid:  ? Foods with added sugar.  ? Fried foods.  ? Foods with partially hydrogenated oils.  · Limit alcohol intake to no more than 1 drink a day for nonpregnant women and 2 drinks a day for men. One drink equals 12 oz of beer, 5 oz of wine, or 1½ oz of hard liquor.  Reading food labels  · Check food labels for:  ? Trans fats.  ? Partially hydrogenated oils.  ? Saturated fat (g) in each serving.  ? Cholesterol (mg) in each serving.  ? Fiber (g) in each serving.  · Choose foods with healthy fats, such as:  ? Monounsaturated fats.  ? Polyunsaturated fats.  ? Omega-3 fats.  · Choose grain products that have whole grains. Look for the word \"whole\" as the first word in the ingredient list.  Cooking  · Cook foods using low-fat methods. These include baking, boiling, grilling, and broiling.  · Eat more home-cooked foods. Eat at restaurants and buffets " less often.  · Avoid cooking using saturated fats, such as butter, cream, palm oil, palm kernel oil, and coconut oil.  Recommended foods    Fruits  · All fresh, canned (in natural juice), or frozen fruits.  Vegetables  · Fresh or frozen vegetables (raw, steamed, roasted, or grilled). Green salads.  Grains  · Whole grains, such as whole wheat or whole grain breads, crackers, cereals, and pasta. Unsweetened oatmeal, bulgur, barley, quinoa, or brown rice. Corn or whole wheat flour tortillas.  Meats and other protein foods  · Ground beef (85% or leaner), grass-fed beef, or beef trimmed of fat. Skinless chicken or turkey. Ground chicken or turkey. Pork trimmed of fat. All fish and seafood. Egg whites. Dried beans, peas, or lentils. Unsalted nuts or seeds. Unsalted canned beans. Nut butters without added sugar or oil.  Dairy  · Low-fat or nonfat dairy products, such as skim or 1% milk, 2% or reduced-fat cheeses, low-fat and fat-free ricotta or cottage cheese, or plain low-fat and nonfat yogurt.  Fats and oils  · Tub margarine without trans fats. Light or reduced-fat mayonnaise and salad dressings. Avocado. Olive, canola, sesame, or safflower oils.  The items listed above may not be a complete list of foods and beverages you can eat. Contact a dietitian for more information.  Foods to avoid  Fruits  · Canned fruit in heavy syrup. Fruit in cream or butter sauce. Fried fruit.  Vegetables  · Vegetables cooked in cheese, cream, or butter sauce. Fried vegetables.  Grains  · White bread. White pasta. White rice. Cornbread. Bagels, pastries, and croissants. Crackers and snack foods that contain trans fat and hydrogenated oils.  Meats and other protein foods  · Fatty cuts of meat. Ribs, chicken wings, castro, sausage, bologna, salami, chitterlings, fatback, hot dogs, bratwurst, and packaged lunch meats. Liver and organ meats. Whole eggs and egg yolks. Chicken and turkey with skin. Fried meat.  Dairy  · Whole or 2% milk, cream,  half-and-half, and cream cheese. Whole milk cheeses. Whole-fat or sweetened yogurt. Full-fat cheeses. Nondairy creamers and whipped toppings. Processed cheese, cheese spreads, and cheese curds.  Beverages  · Alcohol. Sugar-sweetened drinks such as sodas, lemonade, and fruit drinks.  Fats and oils  · Butter, stick margarine, lard, shortening, ghee, or castro fat. Coconut, palm kernel, and palm oils.  Sweets and desserts  · Corn syrup, sugars, honey, and molasses. Candy. Jam and jelly. Syrup. Sweetened cereals. Cookies, pies, cakes, donuts, muffins, and ice cream.  The items listed above may not be a complete list of foods and beverages you should avoid. Contact a dietitian for more information.  Summary  · Choosing the right foods helps keep your fat and cholesterol at normal levels. This can keep you from getting certain diseases.  · At meals, fill one-half of your plate with vegetables and green salads.  · Eat high-fiber foods, like whole grains, beans, apples, carrots, peas, and barley.  · Limit added sugar, saturated fats, alcohol, and fried foods.  This information is not intended to replace advice given to you by your health care provider. Make sure you discuss any questions you have with your health care provider.  Document Released: 06/18/2013 Document Revised: 08/21/2019 Document Reviewed: 09/04/2018  [employer] Interactive Patient Education © 2019 [employer]  BMI for Adults    Body mass index (BMI) is a number that is calculated from a person's weight and height. BMI may help to estimate how much of a person's weight is composed of fat. BMI can help identify those who may be at higher risk for certain medical problems.  How is BMI used with adults?  BMI is used as a screening tool to identify possible weight problems. It is used to check whether a person is obese, overweight, healthy weight, or underweight.  How is BMI calculated?  BMI measures your weight and compares it to your height. This can be done  "either in English ([place] or metric measurements. Note that charts are available to help you find your BMI quickly and easily without having to do these calculations yourself.  To calculate your BMI in English ([place] measurements, your health care provider will:  1. Measure your weight in pounds (lb).  2. Multiply the number of pounds by 703.  ? For example, for a person who weighs 180 lb, multiply that number by 703, which equals 126,540.  3. Measure your height in inches (in). Then multiply that number by itself to get a measurement called \"inches squared.\"  ? For example, for a person who is 70 in tall, the \"inches squared\" measurement is 70 in x 70 in, which equals 4900 inches squared.  4. Divide the total from Step 2 (number of lb x 703) by the total from Step 3 (inches squared): 126,540 ÷ 4900 = 25.8. This is your BMI.  To calculate your BMI in metric measurements, your health care provider will:  1. Measure your weight in kilograms (kg).  2. Measure your height in meters (m). Then multiply that number by itself to get a measurement called \"meters squared.\"  ? For example, for a person who is 1.75 m tall, the \"meters squared\" measurement is 1.75 m x 1.75 m, which is equal to 3.1 meters squared.  3. Divide the number of kilograms (your weight) by the meters squared number. In this example: 70 ÷ 3.1 = 22.6. This is your BMI.  How is BMI interpreted?  To interpret your results, your health care provider will use BMI charts to identify whether you are underweight, normal weight, overweight, or obese. The following guidelines will be used:  · Underweight: BMI less than 18.5.  · Normal weight: BMI between 18.5 and 24.9.  · Overweight: BMI between 25 and 29.9.  · Obese: BMI of 30 and above.  Please note:  · Weight includes both fat and muscle, so someone with a muscular build, such as an athlete, may have a BMI that is higher than 24.9. In cases like these, BMI is not an accurate measure of body fat.  · To determine " if excess body fat is the cause of a BMI of 25 or higher, further assessments may need to be done by a health care provider.  · BMI is usually interpreted in the same way for men and women.  Why is BMI a useful tool?  BMI is useful in two ways:  · Identifying a weight problem that may be related to a medical condition, or that may increase the risk for medical problems.  · Promoting lifestyle and diet changes in order to reach a healthy weight.  Summary  · Body mass index (BMI) is a number that is calculated from a person's weight and height.  · BMI may help to estimate how much of a person's weight is composed of fat. BMI can help identify those who may be at higher risk for certain medical problems.  · BMI can be measured using English measurements or metric measurements.  · To interpret your results, your health care provider will use BMI charts to identify whether you are underweight, normal weight, overweight, or obese.  This information is not intended to replace advice given to you by your health care provider. Make sure you discuss any questions you have with your health care provider.  Document Released: 08/29/2005 Document Revised: 10/31/2018 Document Reviewed: 10/31/2018  [employer] Interactive Patient Education © 2019 [email or web address]

## 2020-02-04 NOTE — PROGRESS NOTES
Problem list     Subjective   Keara Ortiz is a 64 y.o. female     Chief Complaint   Patient presents with   • Coronary Artery Disease     3 month follow up   • Hypertension   Problem List:     1. Minor nonobstructive CAD per cath in 2006.  1.1 Left heart catheter 7/27/06-20% proximal narrowing to the circumflex, EF 65-70%, left ventricular end-diastolic pressure 25  1.2 Stress test 10/13/14-low risk, no evidence of ischemia  1.3 stress test July 2018 demonstrated inferior ischemia and preserved LV function  1.4 cardiac catheterization March 2019 demonstrated 30-50% LAD disease but nonobstructive disease otherwise and preserved LV function  1.5 stress test October 2019 with no evidence of ischemia and dense diaphragmatic attenuation with preserved LV function  2. Hypertension  3. Dyslipidemia  4. Diabetes mellitus  5. CVA   5.1 CT Head 5/31/18 - mod remote microvascular ischemic changes in the periventricular white matter with no acute abnormality or change   6. Shortness of breath 10/13/14-EF 60-65%, mild LVH, mild diastolic dysfunction, revealed to mild TR, mild posterior pericardial effusion which may extend minimally to the lateral wall, PA 30-35  7. History of pancreatitis    HPI    Patient is a 64-year-old female who presents back to the office for follow-up.  We have been following the patient routinely because of history of nonobstructive coronary disease.  Most recently it has been labile hypertension.  Patient is on multiple medications to control her hypertension.  Recently, she was diagnosed with obstructive sleep apnea.  She was referred and has been treated.  Blood pressure has improved.  She is also on oxygen therapy during the day as well because of hypoxia.  She appears to have a degree of lung issues.    However, she continues to still note elevated hypertension and was recently emergency room because of hypertensive urgency.  Patient is on clonidine patch beta ACE and hydralazine..  Patient  is also on maximum dose of calcium channel blocker.  She continues to have elevated blood pressures.    Patient does not describe chest pain or pressure.  She has moderate levels of dyspnea but has improved.  She does not complain of PND orthopnea.    She does not describe palpitations or dysrhythmic symptoms.  She continues to have issues with blood pressure but otherwise is doing well      Current Outpatient Medications on File Prior to Visit   Medication Sig Dispense Refill   • amLODIPine (NORVASC) 5 MG tablet Take 1 tablet by mouth 2 (Two) Times a Day. 180 tablet 3   • asenapine maleate (SAPHRIS) 10 MG sublingual tablet sublingual tablet Saphris (black cherry) 10 mg sublingual tablet     • aspirin 81 MG EC tablet Take 81 mg by mouth Daily.     • busPIRone (BUSPAR) 10 MG tablet Take 10 mg by mouth 3 (Three) Times a Day.     • CloNIDine (CATAPRES) 0.1 MG tablet Take 1 tablet by mouth 3 (Three) Times a Day.     • CloNIDine (CATAPRES-TTS) 0.3 MG/24HR patch clonidine 0.3 mg/24 hr weekly transdermal patch     • dicyclomine (BENTYL) 10 MG capsule Take 10 mg by mouth 4 (Four) Times a Day Before Meals & at Bedtime.     • diphenhydrAMINE (BENADRYL ALLERGY) 25 MG tablet Take BID the day before the cath and q tab the day of the cath. 3 tablet 0   • famotidine (PEPCID) 20 MG tablet BID the day before the cath and the am of the cath 3 tablet 0   • furosemide (LASIX) 80 MG tablet Take 80 mg by mouth Daily.     • gabapentin (NEURONTIN) 800 MG tablet Take 800 mg by mouth 3 (Three) Times a Day.     • lamoTRIgine (LaMICtal) 100 MG tablet Take 100 mg by mouth 2 (Two) Times a Day.     • lisinopril (PRINIVIL,ZESTRIL) 40 MG tablet Take 40 mg by mouth 2 (Two) Times a Day.     • metoprolol succinate XL (TOPROL XL) 100 MG 24 hr tablet Take 1 tablet by mouth Daily. 90 tablet 3   • nitroglycerin (NITROSTAT) 0.4 MG SL tablet Place  under the tongue.     • OLANZapine (zyPREXA) 10 MG tablet Take 10 mg by mouth Daily.     • omeprazole  (priLOSEC) 20 MG capsule Take 20 mg by mouth Daily.     • ondansetron (ZOFRAN) 4 MG tablet Take 4 mg by mouth Every 8 (Eight) Hours As Needed for Nausea or Vomiting.     • oxyCODONE (OXY-IR) 5 MG capsule Take 5 mg by mouth Every 8 (Eight) Hours As Needed for Moderate Pain .     • oxyCODONE-acetaminophen (PERCOCET)  MG per tablet Take 1 tablet by mouth As Needed for Moderate Pain .     • pancrelipase, Lip-Prot-Amyl, (CREON) 06153-68906 units capsule delayed-release particles capsule Take 24,000 units of lipase by mouth 3 (Three) Times a Day With Meals.     • predniSONE (DELTASONE) 50 MG tablet TAKE 50 MG PO 13,7,1 HOUR PRIOR TO CTA APPT. 3 tablet 0   • promethazine (PHENERGAN) 25 MG tablet Take 25 mg by mouth As Needed for Nausea or Vomiting.     • ranolazine (RANEXA) 500 MG 12 hr tablet Take 1 tablet by mouth 2 (Two) Times a Day. 180 tablet 3   • TiZANidine (ZANAFLEX) 4 MG capsule Take 4 mg by mouth 3 (Three) Times a Day.     • traMADol (ULTRAM) 50 MG tablet TAKE 1 TABLET BY MOUTH EVERY 4 TO 6 HOURS AS NEEDED FOR PAIN  1     No current facility-administered medications on file prior to visit.        Fentanyl; Midazolam; Contrast dye; Hydrocodone; Imdur [isosorbide nitrate]; Metoclopramide; Spironolactone; Sulfa antibiotics; Zolpidem; and Penicillins    Past Medical History:   Diagnosis Date   • Arm fracture    • Bowel obstruction (CMS/HCC)    • Cirrhosis of liver (CMS/HCC)    • Disease of thyroid gland    • Hyperlipidemia    • Hypertension    • Kidney stone    • Liver cirrhosis (CMS/HCC)    • Pneumonia        Social History     Socioeconomic History   • Marital status:      Spouse name: Not on file   • Number of children: Not on file   • Years of education: Not on file   • Highest education level: Not on file   Tobacco Use   • Smoking status: Never Smoker   • Smokeless tobacco: Never Used   Substance and Sexual Activity   • Alcohol use: No   • Drug use: Defer   • Sexual activity: Defer       Family  "History   Problem Relation Age of Onset   • Heart disease Mother    • Cancer Mother    • Hypertension Mother    • Hyperlipidemia Mother    • Heart disease Father    • Cancer Father    • Hyperlipidemia Father    • Hypertension Father        Review of Systems   Constitutional: Positive for fatigue (tires easily). Negative for activity change and appetite change.   HENT: Negative for facial swelling and trouble swallowing.    Eyes: Positive for visual disturbance (corrective lenses).   Respiratory: Positive for apnea (CPAP at night) and shortness of breath (Oxygen 2L/Hr/NC). Negative for chest tightness.    Cardiovascular: Positive for leg swelling (bilaterally). Negative for chest pain and palpitations (squeezing sensation).   Gastrointestinal: Negative for abdominal distention, abdominal pain, constipation and nausea.   Endocrine: Negative for cold intolerance and heat intolerance.   Genitourinary: Negative.    Musculoskeletal: Positive for arthralgias and back pain. Negative for gait problem, joint swelling and myalgias.   Skin: Negative for color change and rash.   Allergic/Immunologic: Negative for environmental allergies and food allergies.   Neurological: Positive for numbness (Lower extremities bilaterally,  r/t neuropathy). Negative for dizziness, syncope, weakness and light-headedness.   Hematological: Bruises/bleeds easily.   Psychiatric/Behavioral: Positive for dysphoric mood and sleep disturbance (r/t sleep apnea). Negative for agitation and suicidal ideas. The patient is nervous/anxious.    All other systems reviewed and are negative.      Objective   Vitals:    02/04/20 1333   BP: 162/80   BP Location: Left arm   Patient Position: Sitting   Pulse: 69   Resp: 16   SpO2: 96%   Weight: 86.6 kg (191 lb)   Height: 160 cm (62.99\")      /80 (BP Location: Left arm, Patient Position: Sitting)   Pulse 69   Resp 16   Ht 160 cm (62.99\")   Wt 86.6 kg (191 lb)   SpO2 96%   BMI 33.84 kg/m²     Lab Results " (most recent)     None          Physical Exam   Constitutional: She is oriented to person, place, and time. She appears well-developed and well-nourished. No distress.   HENT:   Head: Normocephalic and atraumatic.   Eyes: Conjunctivae are normal. Right eye exhibits no discharge. Left eye exhibits no discharge. No scleral icterus.   Neck: No JVD present.   Cardiovascular: Normal rate, regular rhythm and normal heart sounds. Exam reveals no gallop and no friction rub.   No murmur heard.  Pulmonary/Chest: Effort normal and breath sounds normal. No respiratory distress. She has no wheezes. She has no rales. She exhibits no tenderness.   Musculoskeletal: Normal range of motion. She exhibits no edema.   Neurological: She is alert and oriented to person, place, and time. No cranial nerve deficit.   Skin: Skin is warm and dry. No rash noted. No erythema. No pallor.   Psychiatric: She has a normal mood and affect. Her behavior is normal.   Nursing note and vitals reviewed.      Procedure   Procedures       Assessment/Plan     Problems Addressed this Visit        Cardiovascular and Mediastinum    Coronary artery disease involving native coronary artery of native heart without angina pectoris    Relevant Orders    Duplex Renal Artery - Bilateral Complete CAR    Labile hypertension - Primary    Relevant Medications    hydrALAZINE (APRESOLINE) 50 MG tablet    Other Relevant Orders    Duplex Renal Artery - Bilateral Complete CAR       Respiratory    Shortness of breath    Relevant Orders    Duplex Renal Artery - Bilateral Complete CAR          Recommendation  1.  Patient with labile hypertension.  I am increasing hydralazine.  She is being treated for sleep apnea blood pressure appears to have improved although she continues to have hypertensive excursions.  Last office visit, we ordered CT of the renals.  This was not performed for some reason.  Hopefully we can get renal artery ultrasound approved.  If abnormal, will consider  CT with contrast.  Obviously, she may have another secondary source of her hypertension and we want to rule this out.  2.  Patient with coronary disease but no ischemic symptoms.  Dyspnea is mild to moderate but patient has underlying lung disease.  For now we will continue to monitor.  3.  We will see her back for follow-up after testing.  She will follow with primary as scheduled           Keara TIFFANY Ortiz  reports that she has never smoked. She has never used smokeless tobacco..      Patient's Body mass index is 33.84 kg/m². BMI is above normal parameters. Recommendations include: educational material.       Electronically signed by:

## 2020-02-18 ENCOUNTER — HOSPITAL ENCOUNTER (OUTPATIENT)
Dept: CARDIOLOGY | Facility: HOSPITAL | Age: 65
End: 2020-02-18

## 2020-02-25 ENCOUNTER — HOSPITAL ENCOUNTER (OUTPATIENT)
Dept: CARDIOLOGY | Facility: HOSPITAL | Age: 65
Discharge: HOME | End: 2020-02-25
Admitting: PHYSICIAN ASSISTANT

## 2020-02-25 PROCEDURE — 93975 VASCULAR STUDY: CPT

## 2020-02-25 PROCEDURE — 93975 VASCULAR STUDY: CPT | Performed by: INTERNAL MEDICINE

## 2020-02-29 LAB
BH CV ECHO MEAS - DIST REN A EDV LEFT: 25 CM/S
BH CV ECHO MEAS - DIST REN A PSV LEFT: 85 CM/S
BH CV ECHO MEAS - MID REN A EDV LEFT: 25 CM/S
BH CV ECHO MEAS - MID REN A PSV LEFT: 106 CM/S
BH CV ECHO MEAS - PROX REN A EDV LEFT: 26 CM/S
BH CV ECHO MEAS - PROX REN A PSV LEFT: 115 CM/S
BH CV VAS KIDNEY HEIGHT LEFT: 5 CM
BH CV VAS RENAL AORTIC MID EDV: 8 CM/S
BH CV VAS RENAL AORTIC MID PSV: 114 CM/S
BH CV XLRA MEAS - KID L LEFT: 44 CM
BH CV XLRA MEAS DIST REN A EDV RIGHT: 34 CM/S
BH CV XLRA MEAS DIST REN A PSV RIGHT: 220 CM/S
BH CV XLRA MEAS KID H RIGHT: 4 CM
BH CV XLRA MEAS KID L RIGHT: 11 CM
BH CV XLRA MEAS KID W RIGHT: 5 CM
BH CV XLRA MEAS MID REN A EDV RIGHT: 24 CM/S
BH CV XLRA MEAS MID REN A PSV RIGHT: 139 CM/S
BH CV XLRA MEAS PROX REN A EDV RIGHT: 23 CM/S
BH CV XLRA MEAS PROX REN A PSV RIGHT: 93 CM/S
BH CV XLRA MEAS RAR LEFT: 1
BH CV XLRA MEAS RAR RIGHT: 1.9
LEFT KIDNEY WIDTH: 6 CM

## 2020-03-03 DIAGNOSIS — I70.1 RENAL ARTERY STENOSIS (HCC): Primary | ICD-10-CM

## 2020-03-04 ENCOUNTER — TELEPHONE (OUTPATIENT)
Dept: CARDIOLOGY | Facility: CLINIC | Age: 65
End: 2020-03-04

## 2020-03-04 RX ORDER — PREDNISONE 50 MG/1
50 TABLET ORAL DAILY
Qty: 3 TABLET | Refills: 0 | Status: SHIPPED | OUTPATIENT
Start: 2020-03-04 | End: 2020-03-05 | Stop reason: SDUPTHER

## 2020-03-04 NOTE — TELEPHONE ENCOUNTER
Left patient message to call office.   Patient needs to have CT abdominal due to 75% stenosis right renal artery.   Scheduled at [facility] on March 18th @ 1:30 pm. Patient will need premeds and directions for contrast allergy.  Prednisone 50 mg po 13 hrs prior          7 hrs prior      1 hr prior  Benadryl 50 mg  Take with you, do not take unless instructed by [facility].   Patient also NEEDS to have BMP done prior.   Sent pre-med to patients pharmacy.         Filemon Lynch APRN [place], Melissa, LPN             Greater than 75% stenosis per ultrasound will need a CTA    Previous Messages            PACS Images      Radiology Images   Duplex Renal Artery - Bilateral Complete CAR   Order: [id number]   Status:  Final result   Visible to patient:  No (Not Released) Dx:  Labile hypertension; Shortness of susu...   Details     Reading Physician Reading Date Result Priority   Hong Manzano MD 2/25/2020 Routine      Result Text     1.  Both kidneys are normal size.     2.  Doppler is compatible with 75% or greater stenosis in the distal right renal artery.     3.  There is 50% or lesser stenosis in the proximal mid left renal artery.     Summary: Potentially hemodynamically significant obstructive disease in the right renal artery.  Consider CTA for more definitive assessment.

## 2020-03-05 ENCOUNTER — LAB (OUTPATIENT)
Dept: LAB | Facility: HOSPITAL | Age: 65
End: 2020-03-05

## 2020-03-05 DIAGNOSIS — Z91.041 CONTRAST MEDIA ALLERGY: ICD-10-CM

## 2020-03-05 DIAGNOSIS — R06.02 SHORTNESS OF BREATH: ICD-10-CM

## 2020-03-05 DIAGNOSIS — I70.1 RENAL ARTERY STENOSIS (HCC): Primary | ICD-10-CM

## 2020-03-05 DIAGNOSIS — I70.1 RENAL ARTERY STENOSIS (HCC): ICD-10-CM

## 2020-03-05 LAB
ANION GAP SERPL CALCULATED.3IONS-SCNC: 17.6 MMOL/L (ref 5–15)
BUN BLD-MCNC: 8 MG/DL (ref 8–23)
BUN/CREAT SERPL: 10.1 (ref 7–25)
CALCIUM SPEC-SCNC: 8.2 MG/DL (ref 8.6–10.5)
CHLORIDE SERPL-SCNC: 104 MMOL/L (ref 98–107)
CO2 SERPL-SCNC: 23.4 MMOL/L (ref 22–29)
CREAT BLD-MCNC: 0.79 MG/DL (ref 0.57–1)
GFR SERPL CREATININE-BSD FRML MDRD: 73 ML/MIN/1.73
GLUCOSE BLD-MCNC: 109 MG/DL (ref 65–99)
POTASSIUM BLD-SCNC: 3.6 MMOL/L (ref 3.5–5.2)
SODIUM BLD-SCNC: 145 MMOL/L (ref 136–145)

## 2020-03-05 PROCEDURE — 80048 BASIC METABOLIC PNL TOTAL CA: CPT | Performed by: NURSE PRACTITIONER

## 2020-03-05 RX ORDER — PREDNISONE 50 MG/1
50 TABLET ORAL DAILY
Qty: 3 TABLET | Refills: 0 | Status: SHIPPED | OUTPATIENT
Start: 2020-03-05 | End: 2020-03-18

## 2020-03-05 NOTE — TELEPHONE ENCOUNTER
Patient notified of results and scheduled CTA time and date. Patient given instructions on meds for contrast allergy. Informed her they were sent to [facility], she requested [facility], canceled prednisone and benadryl at [facility] resent them to [facility]. Patient verbalized understanding. Patient stated she will have BMP done as soon as she could. Terrie Brady LPN

## 2020-03-09 ENCOUNTER — TELEPHONE (OUTPATIENT)
Dept: CARDIOLOGY | Facility: CLINIC | Age: 65
End: 2020-03-09

## 2020-03-09 NOTE — TELEPHONE ENCOUNTER
BMP faxed to [facility] Scheduling through right fax. Fax number [phone] IZABEL GOMEZ

## 2020-03-17 ENCOUNTER — TELEPHONE (OUTPATIENT)
Dept: CARDIOLOGY | Facility: CLINIC | Age: 65
End: 2020-03-17

## 2020-03-17 NOTE — TELEPHONE ENCOUNTER
Kiarra from [facility] radiology called to verify patients contrast allergy pre-meds were sent in and patient was notified. Chart was reviewed , Terrie has spoke with patient and medications were sent into pharmacy as well. -KALA;RIANNA

## 2020-03-18 ENCOUNTER — TELEPHONE (OUTPATIENT)
Dept: CARDIOLOGY | Facility: CLINIC | Age: 65
End: 2020-03-18

## 2020-03-18 RX ORDER — PREDNISONE 50 MG/1
TABLET ORAL
Qty: 3 TABLET | Refills: 0 | Status: SHIPPED | OUTPATIENT
Start: 2020-03-18

## 2020-03-18 NOTE — TELEPHONE ENCOUNTER
Patient made aware of date and time. She has all instructions from before. She request only the Prednisone sent to pharmacy as she has plenty of the Benadryl left. Niharika Su MA      ----- Message from Lottie Everett sent at 3/18/2020  2:24 PM EDT -----  Pt left voice mail today stating that they showed at [facility] today for CT Angio and they stuck her 8 times for the IV and were unable to get stuck.  I called [facility] and spoke with Jasmine in scheduling she r/s'ed it for 3/31 @ 10am. Nothing to eat or drink after midnight and need to arrive at 9.

## 2020-04-01 ENCOUNTER — TELEPHONE (OUTPATIENT)
Dept: CARDIOLOGY | Facility: CLINIC | Age: 65
End: 2020-04-01

## 2020-04-01 NOTE — TELEPHONE ENCOUNTER
03/30/20  Radiology called stating they could not start IV on patient after two attempts by two different people. Stated patient made them stop, she told them she was in the ER over the weekend, was stuck 10 times trying to get at IV.   Terrie Brady LPN

## 2020-04-07 ENCOUNTER — OFFICE VISIT (OUTPATIENT)
Dept: CARDIOLOGY | Facility: CLINIC | Age: 65
End: 2020-04-07

## 2020-04-07 VITALS — HEIGHT: 63 IN | BODY MASS INDEX: 31.01 KG/M2 | WEIGHT: 175 LBS

## 2020-04-07 DIAGNOSIS — I25.10 CORONARY ARTERY DISEASE INVOLVING NATIVE CORONARY ARTERY OF NATIVE HEART WITHOUT ANGINA PECTORIS: Primary | ICD-10-CM

## 2020-04-07 DIAGNOSIS — R06.02 SHORTNESS OF BREATH: ICD-10-CM

## 2020-04-07 DIAGNOSIS — I10 ESSENTIAL HYPERTENSION: ICD-10-CM

## 2020-04-07 PROCEDURE — 99442 PR PHYS/QHP TELEPHONE EVALUATION 11-20 MIN: CPT | Performed by: PHYSICIAN ASSISTANT

## 2020-04-07 RX ORDER — HYDRALAZINE HYDROCHLORIDE 50 MG/1
50 TABLET, FILM COATED ORAL 3 TIMES DAILY
Qty: 270 TABLET | Refills: 0 | Status: SHIPPED | OUTPATIENT
Start: 2020-04-07 | End: 2020-06-18

## 2020-04-07 RX ORDER — PREDNISONE 50 MG/1
TABLET ORAL
Qty: 3 TABLET | Refills: 0 | Status: SHIPPED | OUTPATIENT
Start: 2020-04-07

## 2020-04-07 NOTE — PROGRESS NOTES
Problem list     Subjective   Keara Ortiz is a 64 y.o. female     Chief Complaint   Patient presents with   • Follow-up   • Coronary Artery Disease     Problem List:     1. Minor nonobstructive CAD per cath in 2006.  1.1 Left heart catheter 7/27/06-20% proximal narrowing to the circumflex, EF 65-70%, left ventricular end-diastolic pressure 25  1.2 Stress test 10/13/14-low risk, no evidence of ischemia  1.3 stress test July 2018 demonstrated inferior ischemia and preserved LV function  1.4 cardiac catheterization March 2019 demonstrated 30-50% LAD disease but nonobstructive disease otherwise and preserved LV function  1.5 stress test October 2019 with no evidence of ischemia and dense diaphragmatic attenuation with preserved LV function  2. Hypertension  3. Dyslipidemia  4. Diabetes mellitus  5. CVA   5.1 CT Head 5/31/18 - mod remote microvascular ischemic changes in the periventricular white matter with no acute abnormality or change   6. Shortness of breath 10/13/14-EF 60-65%, mild LVH, mild diastolic dysfunction, revealed to mild TR, mild posterior pericardial effusion which may extend minimally to the lateral wall, PA 30-35  7. History of pancreatitis      HPI    You have chosen to receive care through a telephone visit today. Do you consent to use a telephone visit for your medical care today? Yes    Patient is a 64-year-old female that is being interviewed telephonically.  This is due to the global pandemic.    Patient is done well.  She does not describe any chest pain or pressure.  Her dyspnea is mild when exerting but no progressive shortness of breath.  No PND orthopnea.    She continues to have mild lower extremity edema but nothing that has been significantly progressive.  It is manageable.  She also has had difficulty with blood pressure control.  She was placed on hydralazine to help regards to blood pressure management and she is on multiple medications.  She is being treated for obstructive  sleep apnea.  Recent renal artery duplex demonstrates significant disease noted with CTA recommended.  This has been scheduled twice in the hospital with multiple attempts at IV access which was unsuccessful.      Current Outpatient Medications on File Prior to Visit   Medication Sig Dispense Refill   • amLODIPine (NORVASC) 5 MG tablet Take 1 tablet by mouth 2 (Two) Times a Day. 180 tablet 3   • aspirin 81 MG EC tablet Take 81 mg by mouth Daily.     • busPIRone (BUSPAR) 10 MG tablet Take 10 mg by mouth 3 (Three) Times a Day.     • CloNIDine (CATAPRES) 0.1 MG tablet Take 1 tablet by mouth 3 (Three) Times a Day.     • CloNIDine (CATAPRES-TTS) 0.3 MG/24HR patch clonidine 0.3 mg/24 hr weekly transdermal patch     • dicyclomine (BENTYL) 10 MG capsule Take 10 mg by mouth 4 (Four) Times a Day Before Meals & at Bedtime.     • diphenhydrAMINE (BENADRYL ALLERGY) 25 MG tablet Take BID the day before the cath and q tab the day of the cath. 3 tablet 0   • furosemide (LASIX) 80 MG tablet Take 80 mg by mouth Daily.     • gabapentin (NEURONTIN) 800 MG tablet Take 800 mg by mouth 3 (Three) Times a Day.     • lamoTRIgine (LaMICtal) 100 MG tablet Take 100 mg by mouth 2 (Two) Times a Day.     • lisinopril (PRINIVIL,ZESTRIL) 40 MG tablet Take 40 mg by mouth 2 (Two) Times a Day.     • metoprolol succinate XL (TOPROL XL) 100 MG 24 hr tablet Take 1 tablet by mouth Daily. 90 tablet 3   • nitroglycerin (NITROSTAT) 0.4 MG SL tablet Place  under the tongue.     • OLANZapine (zyPREXA) 10 MG tablet Take 10 mg by mouth Daily.     • omeprazole (priLOSEC) 20 MG capsule Take 20 mg by mouth Daily.     • ondansetron (ZOFRAN) 4 MG tablet Take 4 mg by mouth Every 8 (Eight) Hours As Needed for Nausea or Vomiting.     • oxyCODONE (OXY-IR) 5 MG capsule Take 5 mg by mouth Every 8 (Eight) Hours As Needed for Moderate Pain .     • oxyCODONE-acetaminophen (PERCOCET)  MG per tablet Take 1 tablet by mouth As Needed for Moderate Pain .     • pancrelipase,  Lip-Prot-Amyl, (CREON) 06173-71293 units capsule delayed-release particles capsule Take 24,000 units of lipase by mouth 3 (Three) Times a Day With Meals.     • promethazine (PHENERGAN) 25 MG tablet Take 25 mg by mouth As Needed for Nausea or Vomiting.     • ranolazine (RANEXA) 500 MG 12 hr tablet Take 1 tablet by mouth 2 (Two) Times a Day. 180 tablet 3   • TiZANidine (ZANAFLEX) 4 MG capsule Take 4 mg by mouth 3 (Three) Times a Day.     • traMADol (ULTRAM) 50 MG tablet TAKE 1 TABLET BY MOUTH EVERY 4 TO 6 HOURS AS NEEDED FOR PAIN  1   • [DISCONTINUED] hydrALAZINE (APRESOLINE) 50 MG tablet Take 1 tablet by mouth 3 (Three) Times a Day. 60 tablet 6   • asenapine maleate (SAPHRIS) 10 MG sublingual tablet sublingual tablet Saphris (black cherry) 10 mg sublingual tablet     • diphenhydrAMINE (BENADRYL) 50 MG tablet Take 1 tablet by mouth Take As Directed. Only take if instructed by physician during CT 1 tablet 0   • famotidine (PEPCID) 20 MG tablet BID the day before the cath and the am of the cath 3 tablet 0   • predniSONE (DELTASONE) 50 MG tablet Take 1 tablet 13 hours prior to CT, 2nd tablet 7 hours prior to CT and 3rd tablet 1 hour prior to CT. 3 tablet 0     No current facility-administered medications on file prior to visit.        Fentanyl; Midazolam; Contrast dye; Hydrocodone; Imdur [isosorbide nitrate]; Metoclopramide; Spironolactone; Sulfa antibiotics; Zolpidem; and Penicillins    Past Medical History:   Diagnosis Date   • Arm fracture    • Bowel obstruction (CMS/HCC)    • Cirrhosis of liver (CMS/HCC)    • Disease of thyroid gland    • Hyperlipidemia    • Hypertension    • IBS (irritable bowel syndrome)    • Kidney stone    • Liver cirrhosis (CMS/HCC)    • MAGO (obstructive sleep apnea)    • Pneumonia        Social History     Socioeconomic History   • Marital status:      Spouse name: Not on file   • Number of children: Not on file   • Years of education: Not on file   • Highest education level: Not on file  "  Tobacco Use   • Smoking status: Never Smoker   • Smokeless tobacco: Never Used   Substance and Sexual Activity   • Alcohol use: No   • Drug use: Defer   • Sexual activity: Defer       Family History   Problem Relation Age of Onset   • Heart disease Mother    • Cancer Mother    • Hypertension Mother    • Hyperlipidemia Mother    • Heart disease Father    • Cancer Father    • Hyperlipidemia Father    • Hypertension Father        Review of Systems   Constitutional: Positive for fatigue.   HENT: Negative for congestion, rhinorrhea and sore throat.    Eyes: Positive for visual disturbance (glasses daily).   Respiratory: Positive for apnea (CPAP) and shortness of breath. Negative for chest tightness.    Cardiovascular: Positive for leg swelling (BLE edema around ankles, goes down overnight). Negative for chest pain (Denies CP) and palpitations.   Gastrointestinal: Positive for abdominal pain (occasionally, pt believes it's due to her medications ) and diarrhea (IBS). Negative for blood in stool, constipation, nausea and vomiting.   Endocrine: Negative for cold intolerance and heat intolerance.   Genitourinary: Negative for difficulty urinating, dysuria, frequency, hematuria and urgency.   Musculoskeletal: Positive for arthralgias. Negative for back pain and neck pain.   Skin: Negative for rash and wound.   Allergic/Immunologic: Negative for environmental allergies and food allergies.   Neurological: Positive for numbness (lower extremeties- neuropathy ). Negative for dizziness, syncope, weakness, light-headedness and headaches.   Hematological: Bruises/bleeds easily (bruises).   Psychiatric/Behavioral: Negative for sleep disturbance.   All other systems reviewed and are negative.      Objective   Vitals:    04/07/20 0914   Weight: 79.4 kg (175 lb)   Height: 160 cm (63\")      Ht 160 cm (63\")   Wt 79.4 kg (175 lb) Comment: stated  BMI 31.00 kg/m²     Lab Results (most recent)     None            Procedure   Procedures   "     Assessment/Plan     Problems Addressed this Visit        Cardiovascular and Mediastinum    Essential hypertension    Relevant Medications    hydrALAZINE (APRESOLINE) 50 MG tablet    Coronary artery disease involving native coronary artery of native heart without angina pectoris - Primary       Respiratory    Shortness of breath            Recommendation  1.  Patient with coronary artery disease that is nonobstructive.  She has no ischemic symptoms we will continue current medical regimen  2.  I do recommend statin therapy.  We are having to repeat CTA with runoff.  Because of renal artery stenosis.  We would like to evaluate this further to see if patient has high-grade disease.  If she does, with labile hypertension, will consider referral for intervention  3.  Blood pressure is still elevated but manageable on hydralazine with average systolic blood pressure readings in the 150s to 160.  4.  For now, we will set her back up to have CT scan performed.  We will readdress statin therapy upon follow-up.  She will follow with primary as scheduled       This visit has been rescheduled as a phone visit to comply with patient safety concerns in accordance with CDC recommendations. Total time of discussion was 12 minutes.    Keara Ortiz  reports that she has never smoked. She has never used smokeless tobacco.        Patient's Body mass index is 31 kg/m². BMI is above normal parameters. Recommendations include: educational material.       Electronically signed by:

## 2020-04-07 NOTE — PATIENT INSTRUCTIONS
How to Quarantine at Home  Information for Patients and Families    These instructions are for people with confirmed or suspected COVID-19 who do not need to be hospitalized and those with confirmed COVID-19 who were hospitalized and discharged to care for themselves at home.    If you were tested through the Health Department  The Health Department will monitor your wellbeing.  If it is determined that you do not need to be hospitalized and can be isolated at home, you will be monitored by staff from your local or state health department.     If you were tested through a Commercial [facility]  You will need to monitor yourself and report changes in your symptoms to your doctor.  See the section below called Monitor Your Symptoms.    Follow these steps until a healthcare provider or local or state health department says you can return to your normal activities.    Stay home except to get medical care  • Restrict activities outside your home, except for getting medical care.   • Do not go to work, school, or public areas.   • Avoid using public transportation, ride-sharing, or taxis.    Separate yourself from other people and animals in your home  People  As much as possible, you should stay in a specific room and away from other people in your home. Also, you should use a separate bathroom, if available.    Animals  You should restrict contact with pets and other animals while you are sick with COVID-19, just like you would around other people. When possible, have another member of your household care for your animals while you are sick. If you are sick with COVID-19, avoid contact with your pet, including petting, snuggling, being kissed or licked, and sharing food. If you must care for your pet or be around animals while you are sick, wash your hands before and after you interact with pets and wear a facemask. See COVID-19 and Animals for more information.    Call ahead before visiting your doctor  If you have a medical  appointment, call the healthcare provider and tell them that you have or may have COVID-19. This information will help the healthcare provider’s office take steps to keep other people from getting infected or exposed.    Wear a facemask  You should wear a facemask when you are around other people (e.g., sharing a room or vehicle) or pets and before you enter a healthcare provider’s office.     If you are not able to wear a facemask (for example, because it causes trouble breathing), then people who live with you should not stay in the same room with you, or they should wear a facemask if they enter your room.    Cover your coughs and sneezes  • Cover your mouth and nose with a tissue when you cough or sneeze.   • Throw used tissues in a lined trash can.   • Immediately wash your hands with soap and water for at least 20 seconds or, if soap and water are not available, clean your hands with an alcohol-based hand  that contains at least 60% alcohol.    Clean your hands often  • Wash your hands often with soap and water for at least 20 seconds, especially after blowing your nose, coughing, or sneezing; going to the bathroom; and before eating or preparing food.     • If soap and water are not readily available, use an alcohol-based hand  with at least 60% alcohol, covering all surfaces of your hands and rubbing them together until they feel dry.    • Soap and water are the best option if hands are visibly dirty. Avoid touching your eyes, nose, and mouth with unwashed hands.    Avoid sharing personal household items  • You should not share dishes, drinking glasses, cups, eating utensils, towels, or bedding with other people or pets in your home.   • After using these items, they should be washed thoroughly with soap and water.    Clean all “high-touch” surfaces everyday  • High touch surfaces include counters, tabletops, doorknobs, bathroom fixtures, toilets, phones, keyboards, tablets, and bedside  tables.   • Also, clean any surfaces that may have blood, stool, or body fluids on them.   • Use a household cleaning spray or wipe, according to the label instructions. Labels contain instructions for safe and effective use of the cleaning product, including precautions you should take when applying the product, such as wearing gloves and making sure you have good ventilation during use of the product.    Monitor your symptoms  • Seek prompt medical attention if your illness is worsening (e.g., difficulty breathing).   • Before seeking care, call your healthcare provider and tell them that you have, or are being evaluated for, COVID-19.   • Put on a facemask before you enter the facility.     • These steps will help the healthcare provider’s office to keep other people in the office or waiting room from getting infected or exposed.   • Persons who are placed under active monitoring or facilitated self-monitoring should follow instructions provided by their local health department or occupational health professionals, as appropriate.  • If you have a medical emergency and need to call 911, notify the dispatch personnel that you have, or are being evaluated for COVID-19. If possible, put on a facemask before emergency medical services arrive.    Discontinuing home isolation  Patients with confirmed COVID-19 should remain under home isolation precautions until the risk of secondary transmission to others is thought to be low. The decision to discontinue home isolation precautions should be made on a case-by-case basis, in consultation with healthcare providers and state and local health departments.    The below content are for household members, intimate partners, and caregivers of a patient with symptomatic laboratory-confirmed COVID-19 or a patient under investigation:    Household members, intimate partners, and caregivers may have close contact with a person with symptomatic, laboratory-confirmed COVID-19 or a  person under investigation.     Close contacts should monitor their health; they should call their healthcare provider right away if they develop symptoms suggestive of COVID-19 (e.g., fever, cough, shortness of breath)     Close contacts should also follow these recommendations:  • Make sure that you understand and can help the patient follow their healthcare provider’s instructions for medication(s) and care. You should help the patient with basic needs in the home and provide support for getting groceries, prescriptions, and other personal needs.  • Monitor the patient’s symptoms. If the patient is getting sicker, call his or her healthcare provider and tell them that the patient has laboratory-confirmed COVID-19. This will help the healthcare provider’s office take steps to keep other people in the office or waiting room from getting infected. Ask the healthcare provider to call the local or [facility] health department for additional guidance. If the patient has a medical emergency and you need to call 911, notify the dispatch personnel that the patient has, or is being evaluated for COVID-19.  • Household members should stay in another room or be  from the patient as much as possible. Household members should use a separate bedroom and bathroom, if available.  • Prohibit visitors who do not have an essential need to be in the home.  • Household members should care for any pets in the home. Do not handle pets or other animals while sick.  For more information, see COVID-19 and Animals.  • Make sure that shared spaces in the home have good air flow, such as by an air conditioner or an opened window, weather permitting.  • Perform hand hygiene frequently. Wash your hands often with soap and water for at least 20 seconds or use an alcohol-based hand  that contains 60 to 95% alcohol, covering all surfaces of your hands and rubbing them together until they feel dry. Soap and water should be used  preferentially if hands are visibly dirty.  • Avoid touching your eyes, nose, and mouth with unwashed hands.  • The patient should wear a facemask when you are around other people. If the patient is not able to wear a facemask (for example, because it causes trouble breathing), you, as the caregiver, should wear a mask when you are in the same room as the patient.  • Wear a disposable facemask and gloves when you touch or have contact with the patient’s blood, stool, or body fluids, such as saliva, sputum, nasal mucus, vomit, or urine.   o Throw out disposable facemasks and gloves after using them. Do not reuse.  o When removing personal protective equipment, first remove and dispose of gloves. Then, immediately clean your hands with soap and water or alcohol-based hand . Next, remove and dispose of facemask, and immediately clean your hands again with soap and water or alcohol-based hand .  • Avoid sharing household items with the patient. You should not share dishes, drinking glasses, cups, eating utensils, towels, bedding, or other items. After the patient uses these items, you should wash them thoroughly (see below “Wash laundry thoroughly”).  • Clean all “high-touch” surfaces, such as counters, tabletops, doorknobs, bathroom fixtures, toilets, phones, keyboards, tablets, and bedside tables, every day. Also, clean any surfaces that may have blood, stool, or body fluids on them.   o Use a household cleaning spray or wipe, according to the label instructions. Labels contain instructions for safe and effective use of the cleaning product including precautions you should take when applying the product, such as wearing gloves and making sure you have good ventilation during use of the product.  • Wash laundry thoroughly.   o Immediately remove and wash clothes or bedding that have blood, stool, or body fluids on them.  o Wear disposable gloves while handling soiled items and keep soiled items away  from your body. Clean your hands (with soap and water or an alcohol-based hand ) immediately after removing your gloves.  o Read and follow directions on labels of laundry or clothing items and detergent. In general, using a normal laundry detergent according to washing machine instructions and dry thoroughly using the warmest temperatures recommended on the clothing label.  • Place all used disposable gloves, facemasks, and other contaminated items in a lined container before disposing of them with other household waste. Clean your hands (with soap and water or an alcohol-based hand ) immediately after handling these items. Soap and water should be used preferentially if hands are visibly dirty.  • Discuss any additional questions with your state or local health department or healthcare provider.    Adapted from information provided by the Centers for Disease Control and Prevention.  For more information, visit https://www.cdc.gov/coronavirus/2019-ncov/hcp/guidance-prevent-spread.htmlFat and Cholesterol Restricted Eating Plan  Getting too much fat and cholesterol in your diet may cause health problems. Choosing the right foods helps keep your fat and cholesterol at normal levels. This can keep you from getting certain diseases.  Your doctor may recommend an eating plan that includes:  · Total fat: ______% or less of total calories a day.  · Saturated fat: ______% or less of total calories a day.  · Cholesterol: less than _________mg a day.  · Fiber: ______g a day.  What are tips for following this plan?  Meal planning  · At meals, divide your plate into four equal parts:  ? Fill one-half of your plate with vegetables and green salads.  ? Fill one-fourth of your plate with whole grains.  ? Fill one-fourth of your plate with low-fat (lean) protein foods.  · Eat fish that is high in omega-3 fats at least two times a week. This includes mackerel, tuna, sardines, and salmon.  · Eat foods that are high  "in fiber, such as whole grains, beans, apples, broccoli, carrots, peas, and barley.  General tips    · Work with your doctor to lose weight if you need to.  · Avoid:  ? Foods with added sugar.  ? Fried foods.  ? Foods with partially hydrogenated oils.  · Limit alcohol intake to no more than 1 drink a day for nonpregnant women and 2 drinks a day for men. One drink equals 12 oz of beer, 5 oz of wine, or 1½ oz of hard liquor.  Reading food labels  · Check food labels for:  ? Trans fats.  ? Partially hydrogenated oils.  ? Saturated fat (g) in each serving.  ? Cholesterol (mg) in each serving.  ? Fiber (g) in each serving.  · Choose foods with healthy fats, such as:  ? Monounsaturated fats.  ? Polyunsaturated fats.  ? Omega-3 fats.  · Choose grain products that have whole grains. Look for the word \"whole\" as the first word in the ingredient list.  Cooking  · Cook foods using low-fat methods. These include baking, boiling, grilling, and broiling.  · Eat more home-cooked foods. Eat at restaurants and buffets less often.  · Avoid cooking using saturated fats, such as butter, cream, palm oil, palm kernel oil, and coconut oil.  Recommended foods    Fruits  · All fresh, canned (in natural juice), or frozen fruits.  Vegetables  · Fresh or frozen vegetables (raw, steamed, roasted, or grilled). Green salads.  Grains  · Whole grains, such as whole wheat or whole grain breads, crackers, cereals, and pasta. Unsweetened oatmeal, bulgur, barley, quinoa, or brown rice. Corn or whole wheat flour tortillas.  Meats and other protein foods  · Ground beef (85% or leaner), grass-fed beef, or beef trimmed of fat. Skinless chicken or turkey. Ground chicken or turkey. Pork trimmed of fat. All fish and seafood. Egg whites. Dried beans, peas, or lentils. Unsalted nuts or seeds. Unsalted canned beans. Nut butters without added sugar or oil.  Dairy  · Low-fat or nonfat dairy products, such as skim or 1% milk, 2% or reduced-fat cheeses, low-fat and " fat-free ricotta or cottage cheese, or plain low-fat and nonfat yogurt.  Fats and oils  · Tub margarine without trans fats. Light or reduced-fat mayonnaise and salad dressings. Avocado. Olive, canola, sesame, or safflower oils.  The items listed above may not be a complete list of foods and beverages you can eat. Contact a dietitian for more information.  Foods to avoid  Fruits  · Canned fruit in heavy syrup. Fruit in cream or butter sauce. Fried fruit.  Vegetables  · Vegetables cooked in cheese, cream, or butter sauce. Fried vegetables.  Grains  · White bread. White pasta. White rice. Cornbread. Bagels, pastries, and croissants. Crackers and snack foods that contain trans fat and hydrogenated oils.  Meats and other protein foods  · Fatty cuts of meat. Ribs, chicken wings, castro, sausage, bologna, salami, chitterlings, fatback, hot dogs, bratwurst, and packaged lunch meats. Liver and organ meats. Whole eggs and egg yolks. Chicken and turkey with skin. Fried meat.  Dairy  · Whole or 2% milk, cream, half-and-half, and cream cheese. Whole milk cheeses. Whole-fat or sweetened yogurt. Full-fat cheeses. Nondairy creamers and whipped toppings. Processed cheese, cheese spreads, and cheese curds.  Beverages  · Alcohol. Sugar-sweetened drinks such as sodas, lemonade, and fruit drinks.  Fats and oils  · Butter, stick margarine, lard, shortening, ghee, or castro fat. Coconut, palm kernel, and palm oils.  Sweets and desserts  · Corn syrup, sugars, honey, and molasses. Candy. Jam and jelly. Syrup. Sweetened cereals. Cookies, pies, cakes, donuts, muffins, and ice cream.  The items listed above may not be a complete list of foods and beverages you should avoid. Contact a dietitian for more information.  Summary  · Choosing the right foods helps keep your fat and cholesterol at normal levels. This can keep you from getting certain diseases.  · At meals, fill one-half of your plate with vegetables and green salads.  · Eat high-fiber  "foods, like whole grains, beans, apples, carrots, peas, and barley.  · Limit added sugar, saturated fats, alcohol, and fried foods.  This information is not intended to replace advice given to you by your health care provider. Make sure you discuss any questions you have with your health care provider.  Document Released: 06/18/2013 Document Revised: 08/21/2019 Document Reviewed: 09/04/2018  [employer] Interactive Patient Education © 2020 [employer]  BMI for Adults    Body mass index (BMI) is a number that is calculated from a person's weight and height. BMI may help to estimate how much of a person's weight is composed of fat. BMI can help identify those who may be at higher risk for certain medical problems.  How is BMI used with adults?  BMI is used as a screening tool to identify possible weight problems. It is used to check whether a person is obese, overweight, healthy weight, or underweight.  How is BMI calculated?  BMI measures your weight and compares it to your height. This can be done either in English ([place] or metric measurements. Note that charts are available to help you find your BMI quickly and easily without having to do these calculations yourself.  To calculate your BMI in English ([place] measurements, your health care provider will:  1. Measure your weight in pounds (lb).  2. Multiply the number of pounds by 703.  ? For example, for a person who weighs 180 lb, multiply that number by 703, which equals 126,540.  3. Measure your height in inches (in). Then multiply that number by itself to get a measurement called \"inches squared.\"  ? For example, for a person who is 70 in tall, the \"inches squared\" measurement is 70 in x 70 in, which equals 4900 inches squared.  4. Divide the total from Step 2 (number of lb x 703) by the total from Step 3 (inches squared): 126,540 ÷ 4900 = 25.8. This is your BMI.  To calculate your BMI in metric measurements, your health care provider will:  1. Measure your " "weight in kilograms (kg).  2. Measure your height in meters (m). Then multiply that number by itself to get a measurement called \"meters squared.\"  ? For example, for a person who is 1.75 m tall, the \"meters squared\" measurement is 1.75 m x 1.75 m, which is equal to 3.1 meters squared.  3. Divide the number of kilograms (your weight) by the meters squared number. In this example: 70 ÷ 3.1 = 22.6. This is your BMI.  How is BMI interpreted?  To interpret your results, your health care provider will use BMI charts to identify whether you are underweight, normal weight, overweight, or obese. The following guidelines will be used:  · Underweight: BMI less than 18.5.  · Normal weight: BMI between 18.5 and 24.9.  · Overweight: BMI between 25 and 29.9.  · Obese: BMI of 30 and above.  Please note:  · Weight includes both fat and muscle, so someone with a muscular build, such as an athlete, may have a BMI that is higher than 24.9. In cases like these, BMI is not an accurate measure of body fat.  · To determine if excess body fat is the cause of a BMI of 25 or higher, further assessments may need to be done by a health care provider.  · BMI is usually interpreted in the same way for men and women.  Why is BMI a useful tool?  BMI is useful in two ways:  · Identifying a weight problem that may be related to a medical condition, or that may increase the risk for medical problems.  · Promoting lifestyle and diet changes in order to reach a healthy weight.  Summary  · Body mass index (BMI) is a number that is calculated from a person's weight and height.  · BMI may help to estimate how much of a person's weight is composed of fat. BMI can help identify those who may be at higher risk for certain medical problems.  · BMI can be measured using English measurements or metric measurements.  · To interpret your results, your health care provider will use BMI charts to identify whether you are underweight, normal weight, overweight, or " obese.  This information is not intended to replace advice given to you by your health care provider. Make sure you discuss any questions you have with your health care provider.  Document Released: 08/29/2005 Document Revised: 10/31/2018 Document Reviewed: 10/31/2018  [employer] Interactive Patient Education © 2020 [employer]

## 2020-04-13 ENCOUNTER — TELEPHONE (OUTPATIENT)
Dept: CARDIOLOGY | Facility: CLINIC | Age: 65
End: 2020-04-13

## 2020-04-13 DIAGNOSIS — I70.1 RENAL ARTERY STENOSIS (HCC): Primary | ICD-10-CM

## 2020-04-13 NOTE — TELEPHONE ENCOUNTER
Call from Jean-Pierre at [facility]. They have a question about CTA that was ordered for patient. Spoke to Cameron Hastings and he said this should be changed to CTA Abd/Pelvis with renal protocol. New order faxed. GIO ACUNA

## 2020-05-07 ENCOUNTER — OFFICE VISIT (OUTPATIENT)
Dept: CARDIOLOGY | Facility: CLINIC | Age: 65
End: 2020-05-07

## 2020-05-07 VITALS — HEIGHT: 63 IN | BODY MASS INDEX: 30.48 KG/M2 | WEIGHT: 172 LBS

## 2020-05-07 DIAGNOSIS — R09.89 LABILE HYPERTENSION: ICD-10-CM

## 2020-05-07 DIAGNOSIS — I73.9 PERIPHERAL VASCULAR DISEASE (HCC): ICD-10-CM

## 2020-05-07 DIAGNOSIS — M51.36 DEGENERATIVE DISC DISEASE, LUMBAR: ICD-10-CM

## 2020-05-07 DIAGNOSIS — I70.1 RENAL ARTERY STENOSIS (HCC): Primary | ICD-10-CM

## 2020-05-07 DIAGNOSIS — I25.10 CORONARY ARTERY DISEASE INVOLVING NATIVE CORONARY ARTERY OF NATIVE HEART, ANGINA PRESENCE UNSPECIFIED: ICD-10-CM

## 2020-05-07 PROCEDURE — 99441 PR PHYS/QHP TELEPHONE EVALUATION 5-10 MIN: CPT | Performed by: PHYSICIAN ASSISTANT

## 2020-05-07 NOTE — PROGRESS NOTES
Problem list     Subjective   Keara Ortiz is a 64 y.o. female     Chief Complaint   Patient presents with   • Follow-up   Problem List:     1. Minor nonobstructive CAD per cath in 2006.  1.1 Left heart catheter 7/27/06-20% proximal narrowing to the circumflex, EF 65-70%, left ventricular end-diastolic pressure 25  1.2 Stress test 10/13/14-low risk, no evidence of ischemia  1.3 stress test July 2018 demonstrated inferior ischemia and preserved LV function  1.4 cardiac catheterization March 2019 demonstrated 30-50% LAD disease but nonobstructive disease otherwise and preserved LV function  1.5 stress test October 2019 with no evidence of ischemia and dense diaphragmatic attenuation with preserved LV function  2.  Labile hypertension  2.1 CTA of the renal arteries demonstrated 2 renal arteries bilaterally with greater than 60% in the superior right renal artery with a widely patent inferior, greater than 60% and an accessory renal artery on the left with widely patent superior.  3. Dyslipidemia  4. Diabetes mellitus  5. CVA   5.1 CT Head 5/31/18 - mod remote microvascular ischemic changes in the periventricular white matter with no acute abnormality or change   6. Shortness of breath 10/13/14-EF 60-65%, mild LVH, mild diastolic dysfunction, revealed to mild TR, mild posterior pericardial effusion which may extend minimally to the lateral wall, PA 30-35  7. History of pancreatitis    You have chosen to receive care through a telephone visit. Do you consent to use a telephone visit for your medical care today? Yes  HPI    Patient is a 64-year-old female that is being interviewed telephonically due to the recent global pandemic    She has done well.  We ordered CT because of patient's labile and refractory hypertension.  CT findings do demonstrate disease bilaterally estimated at greater than 60% involving 1 of the 2 renal arteries on both sides.    She does not have chest pain.  She does not describe shortness of  breath.  No PND orthopnea.    She does not describe palpitations or dysrhythmic symptoms.  She does have hypertension with systolic blood pressure readings averaging in the 150s and 160s.  She otherwise is doing well at this time.      Current Outpatient Medications on File Prior to Visit   Medication Sig Dispense Refill   • amLODIPine (NORVASC) 5 MG tablet Take 1 tablet by mouth 2 (Two) Times a Day. 180 tablet 3   • asenapine maleate (SAPHRIS) 10 MG sublingual tablet sublingual tablet Saphris (black cherry) 10 mg sublingual tablet     • aspirin 81 MG EC tablet Take 81 mg by mouth Daily.     • busPIRone (BUSPAR) 10 MG tablet Take 10 mg by mouth 3 (Three) Times a Day.     • CloNIDine (CATAPRES) 0.1 MG tablet Take 1 tablet by mouth 3 (Three) Times a Day.     • CloNIDine (CATAPRES-TTS) 0.3 MG/24HR patch clonidine 0.3 mg/24 hr weekly transdermal patch     • dicyclomine (BENTYL) 10 MG capsule Take 10 mg by mouth 4 (Four) Times a Day Before Meals & at Bedtime.     • diphenhydrAMINE (BENADRYL) 50 MG tablet Take 1 tablet by mouth Take As Directed. Only take if instructed by physician during CT 1 tablet 0   • furosemide (LASIX) 80 MG tablet Take 80 mg by mouth Daily.     • gabapentin (NEURONTIN) 800 MG tablet Take 800 mg by mouth 3 (Three) Times a Day.     • hydrALAZINE (APRESOLINE) 50 MG tablet Take 1 tablet by mouth 3 (Three) Times a Day. 270 tablet 0   • lamoTRIgine (LaMICtal) 100 MG tablet Take 100 mg by mouth 2 (Two) Times a Day.     • lisinopril (PRINIVIL,ZESTRIL) 40 MG tablet Take 40 mg by mouth 2 (Two) Times a Day.     • metoprolol succinate XL (TOPROL XL) 100 MG 24 hr tablet Take 1 tablet by mouth Daily. 90 tablet 3   • nitroglycerin (NITROSTAT) 0.4 MG SL tablet Place  under the tongue.     • OLANZapine (zyPREXA) 10 MG tablet Take 10 mg by mouth Daily.     • omeprazole (priLOSEC) 20 MG capsule Take 20 mg by mouth Daily.     • ondansetron (ZOFRAN) 4 MG tablet Take 4 mg by mouth Every 8 (Eight) Hours As Needed for  Nausea or Vomiting.     • oxyCODONE (OXY-IR) 5 MG capsule Take 5 mg by mouth Every 8 (Eight) Hours As Needed for Moderate Pain .     • oxyCODONE-acetaminophen (PERCOCET)  MG per tablet Take 1 tablet by mouth As Needed for Moderate Pain .     • pancrelipase, Lip-Prot-Amyl, (CREON) 25439-76981 units capsule delayed-release particles capsule Take 24,000 units of lipase by mouth 3 (Three) Times a Day With Meals.     • promethazine (PHENERGAN) 25 MG tablet Take 25 mg by mouth As Needed for Nausea or Vomiting.     • ranolazine (RANEXA) 500 MG 12 hr tablet Take 1 tablet by mouth 2 (Two) Times a Day. 180 tablet 3   • TiZANidine (ZANAFLEX) 4 MG capsule Take 4 mg by mouth 3 (Three) Times a Day.     • traMADol (ULTRAM) 50 MG tablet TAKE 1 TABLET BY MOUTH EVERY 4 TO 6 HOURS AS NEEDED FOR PAIN  1   • diphenhydrAMINE (BENADRYL ALLERGY) 25 MG tablet Take BID the day before the cath and q tab the day of the cath. 3 tablet 0   • diphenhydrAMINE (BENADRYL) 50 MG tablet Bring to CT. Only take if instructed to by physician or radiologist. 1 tablet 0   • famotidine (PEPCID) 20 MG tablet BID the day before the cath and the am of the cath 3 tablet 0   • predniSONE (DELTASONE) 50 MG tablet Take 1 tablet 13 hours prior to CT, 2nd tablet 7 hours prior to CT and 3rd tablet 1 hour prior to CT. 3 tablet 0   • predniSONE (DELTASONE) 50 MG tablet Take 1 tab 13 hours prior to CT. Take second tab 7 hours prior to CT. Take third tab 1 hour prior to CT. 3 tablet 0     No current facility-administered medications on file prior to visit.        Fentanyl; Midazolam; Contrast dye; Hydrocodone; Imdur [isosorbide nitrate]; Metoclopramide; Spironolactone; Sulfa antibiotics; Zolpidem; and Penicillins    Past Medical History:   Diagnosis Date   • Arm fracture    • Bowel obstruction (CMS/HCC)    • Cirrhosis of liver (CMS/HCC)    • Disease of thyroid gland    • Hyperlipidemia    • Hypertension    • IBS (irritable bowel syndrome)    • Kidney stone    •  Liver cirrhosis (CMS/HCC)    • MAGO (obstructive sleep apnea)    • MAGO (obstructive sleep apnea)    • Pneumonia        Social History     Socioeconomic History   • Marital status:      Spouse name: Not on file   • Number of children: Not on file   • Years of education: Not on file   • Highest education level: Not on file   Tobacco Use   • Smoking status: Never Smoker   • Smokeless tobacco: Never Used   Substance and Sexual Activity   • Alcohol use: No   • Drug use: Defer   • Sexual activity: Defer       Family History   Problem Relation Age of Onset   • Heart disease Mother    • Cancer Mother    • Hypertension Mother    • Hyperlipidemia Mother    • Heart disease Father    • Cancer Father    • Hyperlipidemia Father    • Hypertension Father        Review of Systems   Constitutional: Positive for fatigue.   HENT: Negative for congestion, rhinorrhea and sore throat.    Eyes: Positive for visual disturbance (glasses daily ).   Respiratory: Positive for apnea (CPAP). Negative for chest tightness and shortness of breath.    Cardiovascular: Positive for leg swelling (BLE edema, goes down overnight). Negative for chest pain (Denies CP) and palpitations (occasionally ).   Gastrointestinal: Positive for diarrhea. Negative for abdominal pain, blood in stool, constipation, nausea and vomiting.   Endocrine: Positive for cold intolerance. Negative for heat intolerance.   Genitourinary: Negative for difficulty urinating, dysuria, frequency, hematuria and urgency.   Musculoskeletal: Positive for arthralgias, back pain and neck pain.   Skin: Negative for rash and wound.   Allergic/Immunologic: Negative for environmental allergies and food allergies.   Neurological: Positive for dizziness (states it occurs at random). Negative for syncope, weakness, light-headedness, numbness and headaches.   Hematological: Bruises/bleeds easily (both).   Psychiatric/Behavioral: Negative for sleep disturbance.       Objective   Vitals:     "05/07/20 1326   Weight: 78 kg (172 lb)   Height: 160 cm (63\")      Ht 160 cm (63\")   Wt 78 kg (172 lb) Comment: stated  BMI 30.47 kg/m²     Lab Results (most recent)     None          Physical Exam    Procedure   Procedures       Assessment/Plan     Problems Addressed this Visit        Cardiovascular and Mediastinum    Coronary artery disease involving native coronary artery of native heart    Labile hypertension    Peripheral vascular disease (CMS/HCC)    Renal artery stenosis (CMS/HCC) - Primary       Musculoskeletal and Integument    Degenerative disc disease, lumbar    Relevant Orders    Ambulatory Referral to Neurosurgery          Recommendation  1.  Patient with history of nonobstructive coronary artery disease doing remarkably well with no ischemic symptoms we will continue to monitor    2.  Patient with peripheral vascular disease and renal artery stenosis.  Based on the findings above, I am not sure that intervention in either renal artery would prove to somehow significantly reduce patient's labile hypertension.  If she had high-grade bilateral renal artery stenosis, I could see the benefit.  I discussed the results with patient.  She is doing better blood pressure wise.  She still has good flow to the kidneys bilaterally.  For now we will monitor    3.  Patient with labile hypertension but again blood pressures is improved    4.  Evidence of large disc bulge involving L3-L4, L4-L5 and L5-S1.  She would like referral to local neurosurgeon    5.  We will see patient back for follow-up in 6 months or sooner symptoms discussed.  She will follow with primary scheduled           Keara Ortiz  reports that she has never smoked. She has never used smokeless tobacco.       Patient's Body mass index is 30.47 kg/m². BMI is above normal parameters. Recommendations include: educational material.   This visit has been rescheduled as a phone visit to comply with patient safety concerns in accordance with CDC " recommendations. Total time of discussion was approximately 5 minutes.        Electronically signed by:

## 2020-05-07 NOTE — PATIENT INSTRUCTIONS
How to Quarantine at Home  Information for Patients and Families    These instructions are for people with confirmed or suspected COVID-19 who do not need to be hospitalized and those with confirmed COVID-19 who were hospitalized and discharged to care for themselves at home.    If you were tested through the Health Department  The Health Department will monitor your wellbeing.  If it is determined that you do not need to be hospitalized and can be isolated at home, you will be monitored by staff from your local or state health department.     If you were tested through a Commercial [facility]  You will need to monitor yourself and report changes in your symptoms to your doctor.  See the section below called Monitor Your Symptoms.    Follow these steps until a healthcare provider or local or state health department says you can return to your normal activities.    Stay home except to get medical care  • Restrict activities outside your home, except for getting medical care.   • Do not go to work, school, or public areas.   • Avoid using public transportation, ride-sharing, or taxis.    Separate yourself from other people and animals in your home  People  As much as possible, you should stay in a specific room and away from other people in your home. Also, you should use a separate bathroom, if available.    Animals  You should restrict contact with pets and other animals while you are sick with COVID-19, just like you would around other people. When possible, have another member of your household care for your animals while you are sick. If you are sick with COVID-19, avoid contact with your pet, including petting, snuggling, being kissed or licked, and sharing food. If you must care for your pet or be around animals while you are sick, wash your hands before and after you interact with pets and wear a facemask. See COVID-19 and Animals for more information.    Call ahead before visiting your doctor  If you have a medical  appointment, call the healthcare provider and tell them that you have or may have COVID-19. This information will help the healthcare provider’s office take steps to keep other people from getting infected or exposed.    Wear a facemask  You should wear a facemask when you are around other people (e.g., sharing a room or vehicle) or pets and before you enter a healthcare provider’s office.     If you are not able to wear a facemask (for example, because it causes trouble breathing), then people who live with you should not stay in the same room with you, or they should wear a facemask if they enter your room.    Cover your coughs and sneezes  • Cover your mouth and nose with a tissue when you cough or sneeze.   • Throw used tissues in a lined trash can.   • Immediately wash your hands with soap and water for at least 20 seconds or, if soap and water are not available, clean your hands with an alcohol-based hand  that contains at least 60% alcohol.    Clean your hands often  • Wash your hands often with soap and water for at least 20 seconds, especially after blowing your nose, coughing, or sneezing; going to the bathroom; and before eating or preparing food.     • If soap and water are not readily available, use an alcohol-based hand  with at least 60% alcohol, covering all surfaces of your hands and rubbing them together until they feel dry.    • Soap and water are the best option if hands are visibly dirty. Avoid touching your eyes, nose, and mouth with unwashed hands.    Avoid sharing personal household items  • You should not share dishes, drinking glasses, cups, eating utensils, towels, or bedding with other people or pets in your home.   • After using these items, they should be washed thoroughly with soap and water.    Clean all “high-touch” surfaces everyday  • High touch surfaces include counters, tabletops, doorknobs, bathroom fixtures, toilets, phones, keyboards, tablets, and bedside  tables.   • Also, clean any surfaces that may have blood, stool, or body fluids on them.   • Use a household cleaning spray or wipe, according to the label instructions. Labels contain instructions for safe and effective use of the cleaning product, including precautions you should take when applying the product, such as wearing gloves and making sure you have good ventilation during use of the product.    Monitor your symptoms  • Seek prompt medical attention if your illness is worsening (e.g., difficulty breathing).   • Before seeking care, call your healthcare provider and tell them that you have, or are being evaluated for, COVID-19.   • Put on a facemask before you enter the facility.     • These steps will help the healthcare provider’s office to keep other people in the office or waiting room from getting infected or exposed.   • Persons who are placed under active monitoring or facilitated self-monitoring should follow instructions provided by their local health department or occupational health professionals, as appropriate.  • If you have a medical emergency and need to call 911, notify the dispatch personnel that you have, or are being evaluated for COVID-19. If possible, put on a facemask before emergency medical services arrive.    Discontinuing home isolation  Patients with confirmed COVID-19 should remain under home isolation precautions until the risk of secondary transmission to others is thought to be low. The decision to discontinue home isolation precautions should be made on a case-by-case basis, in consultation with healthcare providers and state and local health departments.    The below content are for household members, intimate partners, and caregivers of a patient with symptomatic laboratory-confirmed COVID-19 or a patient under investigation:    Household members, intimate partners, and caregivers may have close contact with a person with symptomatic, laboratory-confirmed COVID-19 or a  person under investigation.     Close contacts should monitor their health; they should call their healthcare provider right away if they develop symptoms suggestive of COVID-19 (e.g., fever, cough, shortness of breath)     Close contacts should also follow these recommendations:  • Make sure that you understand and can help the patient follow their healthcare provider’s instructions for medication(s) and care. You should help the patient with basic needs in the home and provide support for getting groceries, prescriptions, and other personal needs.  • Monitor the patient’s symptoms. If the patient is getting sicker, call his or her healthcare provider and tell them that the patient has laboratory-confirmed COVID-19. This will help the healthcare provider’s office take steps to keep other people in the office or waiting room from getting infected. Ask the healthcare provider to call the local or [facility] health department for additional guidance. If the patient has a medical emergency and you need to call 911, notify the dispatch personnel that the patient has, or is being evaluated for COVID-19.  • Household members should stay in another room or be  from the patient as much as possible. Household members should use a separate bedroom and bathroom, if available.  • Prohibit visitors who do not have an essential need to be in the home.  • Household members should care for any pets in the home. Do not handle pets or other animals while sick.  For more information, see COVID-19 and Animals.  • Make sure that shared spaces in the home have good air flow, such as by an air conditioner or an opened window, weather permitting.  • Perform hand hygiene frequently. Wash your hands often with soap and water for at least 20 seconds or use an alcohol-based hand  that contains 60 to 95% alcohol, covering all surfaces of your hands and rubbing them together until they feel dry. Soap and water should be used  preferentially if hands are visibly dirty.  • Avoid touching your eyes, nose, and mouth with unwashed hands.  • The patient should wear a facemask when you are around other people. If the patient is not able to wear a facemask (for example, because it causes trouble breathing), you, as the caregiver, should wear a mask when you are in the same room as the patient.  • Wear a disposable facemask and gloves when you touch or have contact with the patient’s blood, stool, or body fluids, such as saliva, sputum, nasal mucus, vomit, or urine.   o Throw out disposable facemasks and gloves after using them. Do not reuse.  o When removing personal protective equipment, first remove and dispose of gloves. Then, immediately clean your hands with soap and water or alcohol-based hand . Next, remove and dispose of facemask, and immediately clean your hands again with soap and water or alcohol-based hand .  • Avoid sharing household items with the patient. You should not share dishes, drinking glasses, cups, eating utensils, towels, bedding, or other items. After the patient uses these items, you should wash them thoroughly (see below “Wash laundry thoroughly”).  • Clean all “high-touch” surfaces, such as counters, tabletops, doorknobs, bathroom fixtures, toilets, phones, keyboards, tablets, and bedside tables, every day. Also, clean any surfaces that may have blood, stool, or body fluids on them.   o Use a household cleaning spray or wipe, according to the label instructions. Labels contain instructions for safe and effective use of the cleaning product including precautions you should take when applying the product, such as wearing gloves and making sure you have good ventilation during use of the product.  • Wash laundry thoroughly.   o Immediately remove and wash clothes or bedding that have blood, stool, or body fluids on them.  o Wear disposable gloves while handling soiled items and keep soiled items away  from your body. Clean your hands (with soap and water or an alcohol-based hand ) immediately after removing your gloves.  o Read and follow directions on labels of laundry or clothing items and detergent. In general, using a normal laundry detergent according to washing machine instructions and dry thoroughly using the warmest temperatures recommended on the clothing label.  • Place all used disposable gloves, facemasks, and other contaminated items in a lined container before disposing of them with other household waste. Clean your hands (with soap and water or an alcohol-based hand ) immediately after handling these items. Soap and water should be used preferentially if hands are visibly dirty.  • Discuss any additional questions with your state or local health department or healthcare provider.    Adapted from information provided by the Centers for Disease Control and Prevention.  For more information, visit https://www.cdc.gov/coronavirus/2019-ncov/hcp/guidance-prevent-spread.htmlFat and Cholesterol Restricted Eating Plan  Getting too much fat and cholesterol in your diet may cause health problems. Choosing the right foods helps keep your fat and cholesterol at normal levels. This can keep you from getting certain diseases.  Your doctor may recommend an eating plan that includes:  · Total fat: ______% or less of total calories a day.  · Saturated fat: ______% or less of total calories a day.  · Cholesterol: less than _________mg a day.  · Fiber: ______g a day.  What are tips for following this plan?  Meal planning  · At meals, divide your plate into four equal parts:  ? Fill one-half of your plate with vegetables and green salads.  ? Fill one-fourth of your plate with whole grains.  ? Fill one-fourth of your plate with low-fat (lean) protein foods.  · Eat fish that is high in omega-3 fats at least two times a week. This includes mackerel, tuna, sardines, and salmon.  · Eat foods that are high  "in fiber, such as whole grains, beans, apples, broccoli, carrots, peas, and barley.  General tips    · Work with your doctor to lose weight if you need to.  · Avoid:  ? Foods with added sugar.  ? Fried foods.  ? Foods with partially hydrogenated oils.  · Limit alcohol intake to no more than 1 drink a day for nonpregnant women and 2 drinks a day for men. One drink equals 12 oz of beer, 5 oz of wine, or 1½ oz of hard liquor.  Reading food labels  · Check food labels for:  ? Trans fats.  ? Partially hydrogenated oils.  ? Saturated fat (g) in each serving.  ? Cholesterol (mg) in each serving.  ? Fiber (g) in each serving.  · Choose foods with healthy fats, such as:  ? Monounsaturated fats.  ? Polyunsaturated fats.  ? Omega-3 fats.  · Choose grain products that have whole grains. Look for the word \"whole\" as the first word in the ingredient list.  Cooking  · Cook foods using low-fat methods. These include baking, boiling, grilling, and broiling.  · Eat more home-cooked foods. Eat at restaurants and buffets less often.  · Avoid cooking using saturated fats, such as butter, cream, palm oil, palm kernel oil, and coconut oil.  Recommended foods    Fruits  · All fresh, canned (in natural juice), or frozen fruits.  Vegetables  · Fresh or frozen vegetables (raw, steamed, roasted, or grilled). Green salads.  Grains  · Whole grains, such as whole wheat or whole grain breads, crackers, cereals, and pasta. Unsweetened oatmeal, bulgur, barley, quinoa, or brown rice. Corn or whole wheat flour tortillas.  Meats and other protein foods  · Ground beef (85% or leaner), grass-fed beef, or beef trimmed of fat. Skinless chicken or turkey. Ground chicken or turkey. Pork trimmed of fat. All fish and seafood. Egg whites. Dried beans, peas, or lentils. Unsalted nuts or seeds. Unsalted canned beans. Nut butters without added sugar or oil.  Dairy  · Low-fat or nonfat dairy products, such as skim or 1% milk, 2% or reduced-fat cheeses, low-fat and " fat-free ricotta or cottage cheese, or plain low-fat and nonfat yogurt.  Fats and oils  · Tub margarine without trans fats. Light or reduced-fat mayonnaise and salad dressings. Avocado. Olive, canola, sesame, or safflower oils.  The items listed above may not be a complete list of foods and beverages you can eat. Contact a dietitian for more information.  Foods to avoid  Fruits  · Canned fruit in heavy syrup. Fruit in cream or butter sauce. Fried fruit.  Vegetables  · Vegetables cooked in cheese, cream, or butter sauce. Fried vegetables.  Grains  · White bread. White pasta. White rice. Cornbread. Bagels, pastries, and croissants. Crackers and snack foods that contain trans fat and hydrogenated oils.  Meats and other protein foods  · Fatty cuts of meat. Ribs, chicken wings, castro, sausage, bologna, salami, chitterlings, fatback, hot dogs, bratwurst, and packaged lunch meats. Liver and organ meats. Whole eggs and egg yolks. Chicken and turkey with skin. Fried meat.  Dairy  · Whole or 2% milk, cream, half-and-half, and cream cheese. Whole milk cheeses. Whole-fat or sweetened yogurt. Full-fat cheeses. Nondairy creamers and whipped toppings. Processed cheese, cheese spreads, and cheese curds.  Beverages  · Alcohol. Sugar-sweetened drinks such as sodas, lemonade, and fruit drinks.  Fats and oils  · Butter, stick margarine, lard, shortening, ghee, or castro fat. Coconut, palm kernel, and palm oils.  Sweets and desserts  · Corn syrup, sugars, honey, and molasses. Candy. Jam and jelly. Syrup. Sweetened cereals. Cookies, pies, cakes, donuts, muffins, and ice cream.  The items listed above may not be a complete list of foods and beverages you should avoid. Contact a dietitian for more information.  Summary  · Choosing the right foods helps keep your fat and cholesterol at normal levels. This can keep you from getting certain diseases.  · At meals, fill one-half of your plate with vegetables and green salads.  · Eat high-fiber  "foods, like whole grains, beans, apples, carrots, peas, and barley.  · Limit added sugar, saturated fats, alcohol, and fried foods.  This information is not intended to replace advice given to you by your health care provider. Make sure you discuss any questions you have with your health care provider.  Document Released: 06/18/2013 Document Revised: 08/21/2019 Document Reviewed: 09/04/2018  [employer] Patient Education © 2020 [employer]  BMI for Adults    Body mass index (BMI) is a number that is calculated from a person's weight and height. BMI may help to estimate how much of a person's weight is composed of fat. BMI can help identify those who may be at higher risk for certain medical problems.  How is BMI used with adults?  BMI is used as a screening tool to identify possible weight problems. It is used to check whether a person is obese, overweight, healthy weight, or underweight.  How is BMI calculated?  BMI measures your weight and compares it to your height. This can be done either in English ([place] or metric measurements. Note that charts are available to help you find your BMI quickly and easily without having to do these calculations yourself.  To calculate your BMI in English ([place] measurements, your health care provider will:  1. Measure your weight in pounds (lb).  2. Multiply the number of pounds by 703.  ? For example, for a person who weighs 180 lb, multiply that number by 703, which equals 126,540.  3. Measure your height in inches (in). Then multiply that number by itself to get a measurement called \"inches squared.\"  ? For example, for a person who is 70 in tall, the \"inches squared\" measurement is 70 in x 70 in, which equals 4900 inches squared.  4. Divide the total from Step 2 (number of lb x 703) by the total from Step 3 (inches squared): 126,540 ÷ 4900 = 25.8. This is your BMI.  To calculate your BMI in metric measurements, your health care provider will:  1. Measure your " "weight in kilograms (kg).  2. Measure your height in meters (m). Then multiply that number by itself to get a measurement called \"meters squared.\"  ? For example, for a person who is 1.75 m tall, the \"meters squared\" measurement is 1.75 m x 1.75 m, which is equal to 3.1 meters squared.  3. Divide the number of kilograms (your weight) by the meters squared number. In this example: 70 ÷ 3.1 = 22.6. This is your BMI.  How is BMI interpreted?  To interpret your results, your health care provider will use BMI charts to identify whether you are underweight, normal weight, overweight, or obese. The following guidelines will be used:  · Underweight: BMI less than 18.5.  · Normal weight: BMI between 18.5 and 24.9.  · Overweight: BMI between 25 and 29.9.  · Obese: BMI of 30 and above.  Please note:  · Weight includes both fat and muscle, so someone with a muscular build, such as an athlete, may have a BMI that is higher than 24.9. In cases like these, BMI is not an accurate measure of body fat.  · To determine if excess body fat is the cause of a BMI of 25 or higher, further assessments may need to be done by a health care provider.  · BMI is usually interpreted in the same way for men and women.  Why is BMI a useful tool?  BMI is useful in two ways:  · Identifying a weight problem that may be related to a medical condition, or that may increase the risk for medical problems.  · Promoting lifestyle and diet changes in order to reach a healthy weight.  Summary  · Body mass index (BMI) is a number that is calculated from a person's weight and height.  · BMI may help to estimate how much of a person's weight is composed of fat. BMI can help identify those who may be at higher risk for certain medical problems.  · BMI can be measured using English measurements or metric measurements.  · To interpret your results, your health care provider will use BMI charts to identify whether you are underweight, normal weight, overweight, or " obese.  This information is not intended to replace advice given to you by your health care provider. Make sure you discuss any questions you have with your health care provider.  Document Released: 08/29/2005 Document Revised: 10/31/2018 Document Reviewed: 10/31/2018  [employer] Interactive Patient Education © 2020 [employer]

## 2020-06-01 RX ORDER — PREDNISONE 50 MG/1
TABLET ORAL
Qty: 3 TABLET | Refills: 64 | OUTPATIENT
Start: 2020-06-01

## 2020-06-18 RX ORDER — HYDRALAZINE HYDROCHLORIDE 50 MG/1
TABLET, FILM COATED ORAL
Qty: 270 TABLET | Refills: 3 | Status: SHIPPED | OUTPATIENT
Start: 2020-06-18

## 2022-07-30 ENCOUNTER — TELEPHONE ENCOUNTER (OUTPATIENT)
Age: 67
End: 2022-07-30

## 2022-07-30 RX ORDER — BIFIDOBACTERIUM LONGUM 10MM CELL
1 (ONE) CAPSULE ORAL TWICE A DAY
Qty: 0 | Refills: 10 | OUTPATIENT
Start: 2016-09-08 | End: 2016-10-06

## 2022-07-31 ENCOUNTER — TELEPHONE ENCOUNTER (OUTPATIENT)
Age: 67
End: 2022-07-31

## 2022-07-31 RX ORDER — BIFIDOBACTERIUM LONGUM 10MM CELL
1 (ONE) CAPSULE ORAL TWICE A DAY
Qty: 0 | Refills: 10 | Status: ACTIVE | COMMUNITY
Start: 2016-09-08

## 2024-09-30 LAB
MAXIMAL PREDICTED HEART RATE: 156 BPM
STRESS TARGET HR: 133 BPM